# Patient Record
Sex: FEMALE | Race: WHITE | NOT HISPANIC OR LATINO | Employment: UNEMPLOYED | URBAN - METROPOLITAN AREA
[De-identification: names, ages, dates, MRNs, and addresses within clinical notes are randomized per-mention and may not be internally consistent; named-entity substitution may affect disease eponyms.]

---

## 2023-04-07 LAB
ALANINE AMINOTRANSFERASE (SGPT) (U/L) IN SER/PLAS: 43 U/L (ref 7–45)
ALBUMIN (G/DL) IN SER/PLAS: 3.9 G/DL (ref 3.4–5)
ALKALINE PHOSPHATASE (U/L) IN SER/PLAS: 118 U/L (ref 33–136)
ANION GAP IN SER/PLAS: 12 MMOL/L (ref 10–20)
ASPARTATE AMINOTRANSFERASE (SGOT) (U/L) IN SER/PLAS: 22 U/L (ref 9–39)
BILIRUBIN TOTAL (MG/DL) IN SER/PLAS: 0.3 MG/DL (ref 0–1.2)
CALCIUM (MG/DL) IN SER/PLAS: 10.1 MG/DL (ref 8.6–10.6)
CARBON DIOXIDE, TOTAL (MMOL/L) IN SER/PLAS: 32 MMOL/L (ref 21–32)
CHLORIDE (MMOL/L) IN SER/PLAS: 102 MMOL/L (ref 98–107)
CHOLESTEROL (MG/DL) IN SER/PLAS: 135 MG/DL (ref 0–199)
CHOLESTEROL IN HDL (MG/DL) IN SER/PLAS: 47.6 MG/DL
CHOLESTEROL/HDL RATIO: 2.8
CREATININE (MG/DL) IN SER/PLAS: 0.77 MG/DL (ref 0.5–1.05)
GFR FEMALE: 86 ML/MIN/1.73M2
GLUCOSE (MG/DL) IN SER/PLAS: 96 MG/DL (ref 74–99)
LDL: 60 MG/DL (ref 0–99)
POTASSIUM (MMOL/L) IN SER/PLAS: 4.2 MMOL/L (ref 3.5–5.3)
PROTEIN TOTAL: 6.6 G/DL (ref 6.4–8.2)
SODIUM (MMOL/L) IN SER/PLAS: 142 MMOL/L (ref 136–145)
THYROTROPIN (MIU/L) IN SER/PLAS BY DETECTION LIMIT <= 0.05 MIU/L: 0.71 MIU/L (ref 0.44–3.98)
THYROXINE (T4) FREE (NG/DL) IN SER/PLAS: 1.05 NG/DL (ref 0.78–1.48)
TRIGLYCERIDE (MG/DL) IN SER/PLAS: 138 MG/DL (ref 0–149)
TRIIODOTHYRONINE (T3) FREE (PG/ML) IN SER/PLAS: 4.4 PG/ML (ref 2.3–4.2)
UREA NITROGEN (MG/DL) IN SER/PLAS: 14 MG/DL (ref 6–23)
VLDL: 28 MG/DL (ref 0–40)

## 2023-04-09 DIAGNOSIS — I10 HYPERTENSION, BENIGN: Primary | ICD-10-CM

## 2023-04-09 PROBLEM — M54.17 LUMBOSACRAL RADICULITIS: Status: ACTIVE | Noted: 2023-04-09

## 2023-04-09 PROBLEM — I82.401 ACUTE THROMBOEMBOLISM OF DEEP VEINS OF RIGHT LOWER EXTREMITY (MULTI): Status: ACTIVE | Noted: 2023-04-09

## 2023-04-09 PROBLEM — D64.9 ANEMIA: Status: ACTIVE | Noted: 2023-04-09

## 2023-04-09 PROBLEM — K76.0 FATTY LIVER: Status: ACTIVE | Noted: 2023-04-09

## 2023-04-09 PROBLEM — E06.3 HASHIMOTO'S THYROIDITIS: Status: ACTIVE | Noted: 2023-04-09

## 2023-04-09 PROBLEM — E78.2 HYPERLIPIDEMIA, MIXED: Status: ACTIVE | Noted: 2023-04-09

## 2023-04-09 PROBLEM — K63.5 COLON POLYPS: Status: ACTIVE | Noted: 2023-04-09

## 2023-04-09 PROBLEM — E55.9 VITAMIN D DEFICIENCY: Status: ACTIVE | Noted: 2023-04-09

## 2023-04-09 PROBLEM — N95.1 HOT FLASHES, MENOPAUSAL: Status: ACTIVE | Noted: 2023-04-09

## 2023-04-09 PROBLEM — F32.A DEPRESSION: Status: ACTIVE | Noted: 2023-04-09

## 2023-04-09 PROBLEM — S46.019A STRAIN OF ROTATOR CUFF CAPSULE: Status: ACTIVE | Noted: 2023-04-09

## 2023-04-09 PROBLEM — M43.16 SPONDYLOLISTHESIS AT L4-L5 LEVEL: Status: ACTIVE | Noted: 2023-04-09

## 2023-04-09 PROBLEM — R73.01 IMPAIRED FASTING GLUCOSE: Status: ACTIVE | Noted: 2023-04-09

## 2023-04-09 RX ORDER — LISINOPRIL 20 MG/1
20 TABLET ORAL DAILY
Qty: 90 TABLET | Refills: 0 | Status: SHIPPED | OUTPATIENT
Start: 2023-04-09 | End: 2023-07-07 | Stop reason: SDUPTHER

## 2023-04-09 RX ORDER — ACETAMINOPHEN 500 MG
1 TABLET ORAL DAILY
COMMUNITY
Start: 2020-03-25 | End: 2023-10-17 | Stop reason: ALTCHOICE

## 2023-04-09 RX ORDER — LISINOPRIL 20 MG/1
1 TABLET ORAL DAILY
COMMUNITY
Start: 2014-12-15 | End: 2023-04-09 | Stop reason: SDUPTHER

## 2023-04-09 RX ORDER — BUPROPION HYDROCHLORIDE 300 MG/1
1 TABLET ORAL DAILY
COMMUNITY
Start: 2016-01-18 | End: 2023-04-20

## 2023-04-09 RX ORDER — SIMVASTATIN 40 MG/1
1 TABLET, FILM COATED ORAL DAILY
COMMUNITY
Start: 2016-11-07 | End: 2023-07-07

## 2023-04-09 RX ORDER — THYROID 60 MG/1
1 TABLET ORAL 2 TIMES DAILY
COMMUNITY
Start: 2014-10-20 | End: 2023-04-12

## 2023-04-09 RX ORDER — GABAPENTIN 300 MG/1
CAPSULE ORAL
COMMUNITY
Start: 2019-12-09 | End: 2023-10-30

## 2023-04-09 RX ORDER — HYDROCHLOROTHIAZIDE 25 MG/1
25 TABLET ORAL DAILY
Qty: 90 TABLET | Refills: 0 | Status: SHIPPED | OUTPATIENT
Start: 2023-04-09 | End: 2023-07-07

## 2023-04-09 RX ORDER — HYDROCHLOROTHIAZIDE 25 MG/1
1 TABLET ORAL DAILY
COMMUNITY
Start: 2014-12-15 | End: 2023-04-09 | Stop reason: SDUPTHER

## 2023-04-10 DIAGNOSIS — E06.3 HASHIMOTO'S THYROIDITIS: Primary | ICD-10-CM

## 2023-04-12 RX ORDER — LEVOTHYROXINE, LIOTHYRONINE 38; 9 UG/1; UG/1
TABLET ORAL
Qty: 180 TABLET | Refills: 1 | Status: SHIPPED | OUTPATIENT
Start: 2023-04-12 | End: 2023-10-18 | Stop reason: SDUPTHER

## 2023-04-14 DIAGNOSIS — F33.42 RECURRENT MAJOR DEPRESSIVE DISORDER, IN FULL REMISSION (CMS-HCC): Primary | ICD-10-CM

## 2023-04-20 ENCOUNTER — TELEPHONE (OUTPATIENT)
Dept: PRIMARY CARE | Facility: CLINIC | Age: 63
End: 2023-04-20

## 2023-04-20 RX ORDER — BUPROPION HYDROCHLORIDE 300 MG/1
300 TABLET ORAL DAILY
Qty: 90 TABLET | Refills: 3 | Status: SHIPPED | OUTPATIENT
Start: 2023-04-20 | End: 2024-05-15 | Stop reason: SDUPTHER

## 2023-04-20 NOTE — TELEPHONE ENCOUNTER
Rx Refill Request Telephone Encounter    Name:  Kristyn Quinones  :  [id number]  Medication Name:  Bupropion HCI  mg  Dose : 1 tablet  Route : oral  Frequency : daily  Quantity :    Directions :    Specific Pharmacy location:  [facility]  Date of last appointment:    Date of next appointment:    Best number to reach patient:

## 2023-05-01 ENCOUNTER — TELEPHONE (OUTPATIENT)
Dept: PRIMARY CARE | Facility: CLINIC | Age: 63
End: 2023-05-01

## 2023-05-01 RX ORDER — APIXABAN 5 MG/1
TABLET, FILM COATED ORAL
Qty: 180 TABLET | Refills: 0 | OUTPATIENT
Start: 2023-05-01

## 2023-05-31 ENCOUNTER — TELEPHONE (OUTPATIENT)
Dept: PRIMARY CARE | Facility: CLINIC | Age: 63
End: 2023-05-31

## 2023-06-01 ENCOUNTER — OFFICE VISIT (OUTPATIENT)
Dept: PRIMARY CARE | Facility: CLINIC | Age: 63
End: 2023-06-01
Payer: COMMERCIAL

## 2023-06-01 VITALS
WEIGHT: 210 LBS | HEIGHT: 64 IN | DIASTOLIC BLOOD PRESSURE: 75 MMHG | OXYGEN SATURATION: 97 % | BODY MASS INDEX: 35.85 KG/M2 | SYSTOLIC BLOOD PRESSURE: 113 MMHG | TEMPERATURE: 97.2 F | HEART RATE: 62 BPM

## 2023-06-01 DIAGNOSIS — I10 HYPERTENSION, BENIGN: ICD-10-CM

## 2023-06-01 DIAGNOSIS — E66.01 CLASS 2 SEVERE OBESITY WITH SERIOUS COMORBIDITY AND BODY MASS INDEX (BMI) OF 36.0 TO 36.9 IN ADULT, UNSPECIFIED OBESITY TYPE (MULTI): ICD-10-CM

## 2023-06-01 DIAGNOSIS — I82.401 ACUTE THROMBOEMBOLISM OF DEEP VEINS OF RIGHT LOWER EXTREMITY (MULTI): ICD-10-CM

## 2023-06-01 DIAGNOSIS — S73.191A TEAR OF RIGHT ACETABULAR LABRUM, INITIAL ENCOUNTER: Primary | ICD-10-CM

## 2023-06-01 DIAGNOSIS — Z01.810 PREOPERATIVE CARDIOVASCULAR EXAMINATION: ICD-10-CM

## 2023-06-01 PROBLEM — E66.812 CLASS 2 SEVERE OBESITY WITH SERIOUS COMORBIDITY AND BODY MASS INDEX (BMI) OF 36.0 TO 36.9 IN ADULT: Status: ACTIVE | Noted: 2022-09-11

## 2023-06-01 PROBLEM — E66.9 OBESITY, CLASS I, BMI 30-34.9: Status: ACTIVE | Noted: 2022-09-11

## 2023-06-01 PROBLEM — E66.811 OBESITY, CLASS I, BMI 30-34.9: Status: ACTIVE | Noted: 2022-09-11

## 2023-06-01 PROCEDURE — 93000 ELECTROCARDIOGRAM COMPLETE: CPT | Performed by: FAMILY MEDICINE

## 2023-06-01 PROCEDURE — 3074F SYST BP LT 130 MM HG: CPT | Performed by: FAMILY MEDICINE

## 2023-06-01 PROCEDURE — 3078F DIAST BP <80 MM HG: CPT | Performed by: FAMILY MEDICINE

## 2023-06-01 PROCEDURE — 3008F BODY MASS INDEX DOCD: CPT | Performed by: FAMILY MEDICINE

## 2023-06-01 PROCEDURE — 99214 OFFICE O/P EST MOD 30 MIN: CPT | Performed by: FAMILY MEDICINE

## 2023-06-01 ASSESSMENT — ENCOUNTER SYMPTOMS
NERVOUS/ANXIOUS: 0
VOMITING: 0
DYSURIA: 0
RHINORRHEA: 0
WEAKNESS: 1
FREQUENCY: 0
COUGH: 0
SHORTNESS OF BREATH: 0
EYE PAIN: 0
BACK PAIN: 1
SLEEP DISTURBANCE: 1
PALPITATIONS: 0
DIZZINESS: 0
FATIGUE: 0
ABDOMINAL PAIN: 0
DIARRHEA: 0
ARTHRALGIAS: 1
SORE THROAT: 0
NAUSEA: 0
HEADACHES: 0
DYSPHORIC MOOD: 0
BLOOD IN STOOL: 0
CONSTIPATION: 1
NUMBNESS: 0
UNEXPECTED WEIGHT CHANGE: 1

## 2023-06-01 ASSESSMENT — LIFESTYLE VARIABLES: HOW OFTEN DO YOU HAVE A DRINK CONTAINING ALCOHOL: MONTHLY OR LESS

## 2023-06-01 ASSESSMENT — PATIENT HEALTH QUESTIONNAIRE - PHQ9
SUM OF ALL RESPONSES TO PHQ9 QUESTIONS 1 AND 2: 0
1. LITTLE INTEREST OR PLEASURE IN DOING THINGS: NOT AT ALL
2. FEELING DOWN, DEPRESSED OR HOPELESS: NOT AT ALL

## 2023-06-01 NOTE — PROGRESS NOTES
"Subjective   Patient ID: Kristyn Quinones is a 63 y.o. female who presents for Pre-op Exam (For right hip surgery).    [facility] is here for preoperative cardiovascular evaluation.  She has torn ligaments and cartilage in her right hip and will be having arthroscopic surgery to repair it.  She has no history of problems with surgery or anesthesia.  She has no bleeding problems.  She does have a history of DVT which was felt to be due to inactivity. No known heart problems.  She is currently inactive due to pain.        Review of Systems   Constitutional:  Positive for unexpected weight change (gaining weight due to inactivity). Negative for fatigue.   HENT:  Negative for congestion, ear pain, rhinorrhea and sore throat.    Eyes:  Negative for pain and visual disturbance.   Respiratory:  Negative for cough and shortness of breath.    Cardiovascular:  Negative for chest pain and palpitations.   Gastrointestinal:  Positive for constipation (due to narcotics). Negative for abdominal pain, blood in stool, diarrhea, nausea and vomiting.   Genitourinary:  Negative for dysuria, frequency and vaginal discharge.   Musculoskeletal:  Positive for arthralgias and back pain.   Skin:  Negative for rash.   Neurological:  Positive for weakness (right leg). Negative for dizziness, numbness and headaches.   Psychiatric/Behavioral:  Positive for sleep disturbance. Negative for dysphoric mood. The patient is not nervous/anxious.        Objective     /75   Pulse 62   Temp 36.2 °C (97.2 °F)   Ht 1.626 m (5' 4\")   Wt 95.3 kg (210 lb)   SpO2 97%   BMI 36.05 kg/m²     Physical Exam  Constitutional:       General: She is not in acute distress.     Appearance: She is obese.   HENT:      Right Ear: Tympanic membrane normal.      Left Ear: Tympanic membrane normal.   Neck:      Thyroid: No thyromegaly.   Cardiovascular:      Rate and Rhythm: Regular rhythm. Tachycardia present.      Heart sounds: No murmur heard.  Pulmonary:      " Effort: No respiratory distress.      Breath sounds: Normal breath sounds.   Abdominal:      General: Bowel sounds are normal. There is no distension.      Palpations: Abdomen is soft. There is no hepatomegaly or splenomegaly.      Tenderness: There is no abdominal tenderness.   Musculoskeletal:      Comments: Gait is antalgic, walking with a limp.  Right thigh is mildly swollen.  Full exam deferred to orthopedics.   Lymphadenopathy:      Cervical: No cervical adenopathy.   Skin:     General: Skin is warm and dry.      Coloration: Skin is not jaundiced.      Findings: No rash.   Neurological:      General: No focal deficit present.      Mental Status: She is alert and oriented to person, place, and time.   Psychiatric:         Mood and Affect: Mood normal.         Behavior: Behavior normal.             Assessment/Plan   Problem List Items Addressed This Visit          Circulatory    Acute thromboembolism of deep veins of right lower extremity (CMS/HCC)    Current Assessment & Plan     She will need assiduous attention to anticoagulation post-op.         Hypertension, benign    Current Assessment & Plan     Blood pressure is controlled.            Musculoskeletal    Labral tear of right hip joint - Primary       Endocrine/Metabolic    Class 2 severe obesity with serious comorbidity and body mass index (BMI) of 36.0 to 36.9 in adult (CMS/HCC)     Other Visit Diagnoses       Preoperative cardiovascular examination        She is at acceptable risk for elective orthopedic surgery.  Recommend proceeding.    Relevant Orders    ECG 12 lead (Completed)

## 2023-07-05 DIAGNOSIS — E78.2 HYPERLIPIDEMIA, MIXED: Primary | ICD-10-CM

## 2023-07-05 DIAGNOSIS — I10 HYPERTENSION, BENIGN: ICD-10-CM

## 2023-07-06 ENCOUNTER — TELEPHONE (OUTPATIENT)
Dept: PRIMARY CARE | Facility: CLINIC | Age: 63
End: 2023-07-06

## 2023-07-06 NOTE — TELEPHONE ENCOUNTER
Pt also has two other refills called in from pharmacy.    Name:  Kristyn F Joni  :  [id number]  Medication Name:  Lisinopril  Dose : 20 Mg  Route : Oral  Frequency : 1 per day  Quantity : 90 tablets  Directions : Take one tablet by mouth once daily  Specific Pharmacy location:  [facility]  Date of last appointment:  2023  Date of next appointment:  N/A

## 2023-07-07 DIAGNOSIS — I10 HYPERTENSION, BENIGN: ICD-10-CM

## 2023-07-07 RX ORDER — HYDROCHLOROTHIAZIDE 25 MG/1
TABLET ORAL
Qty: 90 TABLET | Refills: 0 | Status: SHIPPED | OUTPATIENT
Start: 2023-07-07 | End: 2023-10-18 | Stop reason: SDUPTHER

## 2023-07-07 RX ORDER — SIMVASTATIN 40 MG/1
TABLET, FILM COATED ORAL
Qty: 90 TABLET | Refills: 0 | Status: SHIPPED | OUTPATIENT
Start: 2023-07-07 | End: 2023-10-18 | Stop reason: SDUPTHER

## 2023-07-07 RX ORDER — LISINOPRIL 20 MG/1
20 TABLET ORAL DAILY
Qty: 90 TABLET | Refills: 0 | Status: SHIPPED | OUTPATIENT
Start: 2023-07-07 | End: 2023-10-17 | Stop reason: SDUPTHER

## 2023-10-10 DIAGNOSIS — E78.2 HYPERLIPIDEMIA, MIXED: ICD-10-CM

## 2023-10-10 RX ORDER — SIMVASTATIN 40 MG/1
TABLET, FILM COATED ORAL
Qty: 90 TABLET | Refills: 0 | OUTPATIENT
Start: 2023-10-10

## 2023-10-10 RX ORDER — GABAPENTIN 300 MG/1
CAPSULE ORAL
Qty: 270 CAPSULE | Refills: 0 | OUTPATIENT
Start: 2023-10-10

## 2023-10-11 ENCOUNTER — TELEPHONE (OUTPATIENT)
Dept: PRIMARY CARE | Facility: CLINIC | Age: 63
End: 2023-10-11

## 2023-10-11 NOTE — TELEPHONE ENCOUNTER
Patient needs medications renewed to [facility].    Gabapentin 300 MG  Hydrochlorothiazide 25 MG  Lisinopril 20 MG  Simvastatin 40 MG

## 2023-10-15 RX ORDER — NAPROXEN 500 MG/1
500 TABLET ORAL DAILY
COMMUNITY
Start: 2023-06-14 | End: 2023-10-17 | Stop reason: ALTCHOICE

## 2023-10-15 RX ORDER — APIXABAN 5 MG/1
10 TABLET, FILM COATED ORAL 2 TIMES DAILY
COMMUNITY
Start: 2023-06-14 | End: 2023-10-17 | Stop reason: ALTCHOICE

## 2023-10-17 ENCOUNTER — LAB (OUTPATIENT)
Dept: LAB | Facility: LAB | Age: 63
End: 2023-10-17
Payer: COMMERCIAL

## 2023-10-17 ENCOUNTER — OFFICE VISIT (OUTPATIENT)
Dept: PRIMARY CARE | Facility: CLINIC | Age: 63
End: 2023-10-17
Payer: COMMERCIAL

## 2023-10-17 VITALS
WEIGHT: 207.2 LBS | BODY MASS INDEX: 35.57 KG/M2 | HEART RATE: 87 BPM | TEMPERATURE: 97.7 F | OXYGEN SATURATION: 98 % | DIASTOLIC BLOOD PRESSURE: 62 MMHG | SYSTOLIC BLOOD PRESSURE: 95 MMHG

## 2023-10-17 DIAGNOSIS — Z11.59 NEED FOR HEPATITIS C SCREENING TEST: ICD-10-CM

## 2023-10-17 DIAGNOSIS — I10 HYPERTENSION, BENIGN: Primary | ICD-10-CM

## 2023-10-17 DIAGNOSIS — E06.3 HASHIMOTO'S THYROIDITIS: ICD-10-CM

## 2023-10-17 DIAGNOSIS — I10 HYPERTENSION, BENIGN: ICD-10-CM

## 2023-10-17 PROBLEM — I82.401 ACUTE THROMBOEMBOLISM OF DEEP VEINS OF RIGHT LOWER EXTREMITY (MULTI): Status: RESOLVED | Noted: 2023-04-09 | Resolved: 2023-10-17

## 2023-10-17 LAB
ALBUMIN SERPL BCP-MCNC: 4.2 G/DL (ref 3.4–5)
ALP SERPL-CCNC: 134 U/L (ref 33–136)
ALT SERPL W P-5'-P-CCNC: 28 U/L (ref 7–45)
ANION GAP SERPL CALC-SCNC: 14 MMOL/L (ref 10–20)
AST SERPL W P-5'-P-CCNC: 24 U/L (ref 9–39)
BILIRUB SERPL-MCNC: 0.3 MG/DL (ref 0–1.2)
BUN SERPL-MCNC: 8 MG/DL (ref 6–23)
CALCIUM SERPL-MCNC: 10.7 MG/DL (ref 8.6–10.6)
CHLORIDE SERPL-SCNC: 103 MMOL/L (ref 98–107)
CHOLEST SERPL-MCNC: 147 MG/DL (ref 0–199)
CHOLESTEROL/HDL RATIO: 3.3
CO2 SERPL-SCNC: 30 MMOL/L (ref 21–32)
CREAT SERPL-MCNC: 0.81 MG/DL (ref 0.5–1.05)
GFR SERPL CREATININE-BSD FRML MDRD: 81 ML/MIN/1.73M*2
GLUCOSE SERPL-MCNC: 87 MG/DL (ref 74–99)
HCV AB SER QL: NONREACTIVE
HDLC SERPL-MCNC: 45 MG/DL
LDLC SERPL CALC-MCNC: 58 MG/DL
NON HDL CHOLESTEROL: 102 MG/DL (ref 0–149)
POTASSIUM SERPL-SCNC: 4 MMOL/L (ref 3.5–5.3)
PROT SERPL-MCNC: 6.8 G/DL (ref 6.4–8.2)
SODIUM SERPL-SCNC: 143 MMOL/L (ref 136–145)
T3FREE SERPL-MCNC: 4.7 PG/ML (ref 2.3–4.2)
T4 FREE SERPL-MCNC: 0.99 NG/DL (ref 0.78–1.48)
TRIGL SERPL-MCNC: 221 MG/DL (ref 0–149)
TSH SERPL-ACNC: 0.06 MIU/L (ref 0.44–3.98)
VLDL: 44 MG/DL (ref 0–40)

## 2023-10-17 PROCEDURE — 84439 ASSAY OF FREE THYROXINE: CPT

## 2023-10-17 PROCEDURE — 99214 OFFICE O/P EST MOD 30 MIN: CPT | Performed by: FAMILY MEDICINE

## 2023-10-17 PROCEDURE — 3074F SYST BP LT 130 MM HG: CPT | Performed by: FAMILY MEDICINE

## 2023-10-17 PROCEDURE — 36415 COLL VENOUS BLD VENIPUNCTURE: CPT

## 2023-10-17 PROCEDURE — 3008F BODY MASS INDEX DOCD: CPT | Performed by: FAMILY MEDICINE

## 2023-10-17 PROCEDURE — 86803 HEPATITIS C AB TEST: CPT

## 2023-10-17 PROCEDURE — 80061 LIPID PANEL: CPT

## 2023-10-17 PROCEDURE — 84443 ASSAY THYROID STIM HORMONE: CPT

## 2023-10-17 PROCEDURE — 3078F DIAST BP <80 MM HG: CPT | Performed by: FAMILY MEDICINE

## 2023-10-17 PROCEDURE — 80053 COMPREHEN METABOLIC PANEL: CPT

## 2023-10-17 PROCEDURE — 84481 FREE ASSAY (FT-3): CPT

## 2023-10-17 RX ORDER — LISINOPRIL 10 MG/1
10 TABLET ORAL DAILY
Qty: 90 TABLET | Refills: 0 | Status: SHIPPED | OUTPATIENT
Start: 2023-10-17 | End: 2024-02-26 | Stop reason: SDUPTHER

## 2023-10-17 ASSESSMENT — ENCOUNTER SYMPTOMS
SORE THROAT: 0
HEADACHES: 0
FATIGUE: 0
CONSTIPATION: 1
UNEXPECTED WEIGHT CHANGE: 1
WEAKNESS: 1
BLOOD IN STOOL: 0
ARTHRALGIAS: 1
SLEEP DISTURBANCE: 1
RHINORRHEA: 0
PALPITATIONS: 0
BACK PAIN: 1
DIARRHEA: 0
NAUSEA: 0
COUGH: 0
FREQUENCY: 0
NERVOUS/ANXIOUS: 0
DYSPHORIC MOOD: 0
ABDOMINAL PAIN: 0
DYSURIA: 0
EYE PAIN: 0
SHORTNESS OF BREATH: 0
DIZZINESS: 0
VOMITING: 0
NUMBNESS: 0

## 2023-10-17 ASSESSMENT — PATIENT HEALTH QUESTIONNAIRE - PHQ9
1. LITTLE INTEREST OR PLEASURE IN DOING THINGS: NOT AT ALL
2. FEELING DOWN, DEPRESSED OR HOPELESS: NOT AT ALL
SUM OF ALL RESPONSES TO PHQ9 QUESTIONS 1 AND 2: 0

## 2023-10-17 NOTE — PROGRESS NOTES
Subjective   Patient ID: Kristyn Quinones is a 63 y.o. female who presents for Follow-up (Refills, and blood work.).    [facility] is here to follow up on her blood pressure.  She has not been monitoring it at home.  She is starting to get more active, had hip surgery earlier this year.  She still has a lot of pain in her tailbone, has seen pain management but it has been a long time.        Review of Systems   Constitutional:  Positive for unexpected weight change (gaining weight due to inactivity). Negative for fatigue.   HENT:  Negative for congestion, ear pain, rhinorrhea and sore throat.    Eyes:  Negative for pain and visual disturbance.   Respiratory:  Negative for cough and shortness of breath.    Cardiovascular:  Negative for chest pain and palpitations.   Gastrointestinal:  Positive for constipation (due to narcotics). Negative for abdominal pain, blood in stool, diarrhea, nausea and vomiting.   Genitourinary:  Negative for dysuria, frequency and vaginal discharge.   Musculoskeletal:  Positive for arthralgias and back pain.   Skin:  Negative for rash.   Neurological:  Positive for weakness (right leg). Negative for dizziness, numbness and headaches.   Psychiatric/Behavioral:  Positive for sleep disturbance. Negative for dysphoric mood. The patient is not nervous/anxious.        Objective     BP 95/62   Pulse 87   Temp 36.5 °C (97.7 °F)   Wt 94 kg (207 lb 3.2 oz)   SpO2 98%   BMI 35.57 kg/m²     Physical Exam  Constitutional:       General: She is not in acute distress.     Appearance: She is obese.   HENT:      Right Ear: Tympanic membrane normal.      Left Ear: Tympanic membrane normal.   Neck:      Thyroid: No thyromegaly.   Cardiovascular:      Rate and Rhythm: Normal rate and regular rhythm.      Heart sounds: No murmur heard.  Pulmonary:      Effort: No respiratory distress.      Breath sounds: Normal breath sounds.   Abdominal:      General: Bowel sounds are normal. There is no distension.       Palpations: Abdomen is soft. There is no hepatomegaly or splenomegaly.   Musculoskeletal:      Comments: Gait is antalgic, walks with a cane.   Lymphadenopathy:      Cervical: No cervical adenopathy.   Neurological:      Mental Status: She is alert.             Assessment/Plan   Problem List Items Addressed This Visit       Hashimoto's thyroiditis    Relevant Orders    TSH    T4, free    T3, free    Hypertension, benign - Primary    Current Assessment & Plan     Blood pressure is a bit lower than I like, will decrease the lisinopril.  Labs ordered.         Relevant Medications    lisinopril 10 mg tablet    Other Relevant Orders    Lipid Panel    Comprehensive Metabolic Panel     Other Visit Diagnoses       Need for hepatitis C screening test        Relevant Orders    Hepatitis C Antibody

## 2023-10-18 DIAGNOSIS — E06.3 HASHIMOTO'S THYROIDITIS: ICD-10-CM

## 2023-10-18 DIAGNOSIS — I10 HYPERTENSION, BENIGN: ICD-10-CM

## 2023-10-18 DIAGNOSIS — E78.2 HYPERLIPIDEMIA, MIXED: ICD-10-CM

## 2023-10-18 RX ORDER — THYROID 60 MG/1
60 TABLET ORAL
Qty: 30 TABLET | Refills: 1 | Status: SHIPPED | OUTPATIENT
Start: 2023-10-18 | End: 2023-12-21 | Stop reason: SDUPTHER

## 2023-10-18 RX ORDER — SIMVASTATIN 40 MG/1
40 TABLET, FILM COATED ORAL DAILY
Qty: 90 TABLET | Refills: 1 | Status: SHIPPED | OUTPATIENT
Start: 2023-10-18 | End: 2024-05-20 | Stop reason: SDUPTHER

## 2023-10-18 RX ORDER — THYROID 30 MG/1
TABLET ORAL
Qty: 30 TABLET | Refills: 1 | Status: SHIPPED | OUTPATIENT
Start: 2023-10-18 | End: 2023-12-21 | Stop reason: SDUPTHER

## 2023-10-18 RX ORDER — HYDROCHLOROTHIAZIDE 25 MG/1
25 TABLET ORAL DAILY
Qty: 90 TABLET | Refills: 1 | Status: SHIPPED | OUTPATIENT
Start: 2023-10-18 | End: 2024-05-20 | Stop reason: SDUPTHER

## 2023-10-24 ENCOUNTER — APPOINTMENT (OUTPATIENT)
Dept: PRIMARY CARE | Facility: CLINIC | Age: 63
End: 2023-10-24

## 2023-10-24 DIAGNOSIS — M54.17 LUMBOSACRAL RADICULITIS: Primary | ICD-10-CM

## 2023-10-30 RX ORDER — GABAPENTIN 300 MG/1
CAPSULE ORAL
Qty: 270 CAPSULE | Refills: 1 | Status: SHIPPED | OUTPATIENT
Start: 2023-10-30

## 2023-12-12 ENCOUNTER — LAB (OUTPATIENT)
Dept: LAB | Facility: LAB | Age: 63
End: 2023-12-12

## 2023-12-12 DIAGNOSIS — E06.3 HASHIMOTO'S THYROIDITIS: ICD-10-CM

## 2023-12-12 PROCEDURE — 84443 ASSAY THYROID STIM HORMONE: CPT

## 2023-12-12 PROCEDURE — 84481 FREE ASSAY (FT-3): CPT

## 2023-12-12 PROCEDURE — 84439 ASSAY OF FREE THYROXINE: CPT

## 2023-12-13 LAB
T3FREE SERPL-MCNC: 3.7 PG/ML (ref 2.3–4.2)
T4 FREE SERPL-MCNC: 0.72 NG/DL (ref 0.78–1.48)
TSH SERPL-ACNC: 9.15 MIU/L (ref 0.44–3.98)

## 2023-12-18 DIAGNOSIS — E06.3 HASHIMOTO'S THYROIDITIS: ICD-10-CM

## 2023-12-21 RX ORDER — THYROID 60 MG/1
60 TABLET ORAL
Qty: 90 TABLET | Refills: 0 | Status: SHIPPED | OUTPATIENT
Start: 2023-12-21 | End: 2024-05-20 | Stop reason: SDUPTHER

## 2023-12-21 RX ORDER — THYROID 30 MG/1
45 TABLET ORAL DAILY
Qty: 30 TABLET | Refills: 1 | Status: SHIPPED | OUTPATIENT
Start: 2023-12-21 | End: 2024-05-20 | Stop reason: DRUGHIGH

## 2024-02-07 RX ORDER — LISINOPRIL 20 MG/1
20 TABLET ORAL DAILY
Qty: 30 TABLET | Refills: 0 | OUTPATIENT
Start: 2024-02-07

## 2024-02-08 ENCOUNTER — TELEPHONE (OUTPATIENT)
Dept: PRIMARY CARE | Facility: CLINIC | Age: 64
End: 2024-02-08

## 2024-02-25 DIAGNOSIS — I10 HYPERTENSION, BENIGN: ICD-10-CM

## 2024-02-26 RX ORDER — LISINOPRIL 10 MG/1
10 TABLET ORAL DAILY
Qty: 90 TABLET | Refills: 0 | Status: SHIPPED | OUTPATIENT
Start: 2024-02-26 | End: 2024-05-26

## 2024-02-29 ENCOUNTER — TELEPHONE (OUTPATIENT)
Dept: PRIMARY CARE | Facility: CLINIC | Age: 64
End: 2024-02-29

## 2024-02-29 NOTE — TELEPHONE ENCOUNTER
Kirsten was calling from [facility] for this patient saying doctor melanie was requesting to put more diagnoses codes on her paperwork. I asked Doctor Mendoza and she said that what was sent in originally was what she wanted, and there is no need to send it in again.    I scanned the blank form to verify we did receive it.

## 2024-05-10 DIAGNOSIS — F33.42 RECURRENT MAJOR DEPRESSIVE DISORDER, IN FULL REMISSION (CMS-HCC): ICD-10-CM

## 2024-05-10 DIAGNOSIS — I10 HYPERTENSION, BENIGN: ICD-10-CM

## 2024-05-10 DIAGNOSIS — E06.3 HASHIMOTO'S THYROIDITIS: ICD-10-CM

## 2024-05-10 RX ORDER — BUPROPION HYDROCHLORIDE 300 MG/1
300 TABLET ORAL DAILY
Qty: 90 TABLET | Refills: 0 | OUTPATIENT
Start: 2024-05-10

## 2024-05-10 RX ORDER — HYDROCHLOROTHIAZIDE 25 MG/1
25 TABLET ORAL DAILY
Qty: 90 TABLET | Refills: 0 | OUTPATIENT
Start: 2024-05-10

## 2024-05-10 RX ORDER — LEVOTHYROXINE, LIOTHYRONINE 19; 4.5 UG/1; UG/1
TABLET ORAL
Qty: 30 TABLET | Refills: 0 | OUTPATIENT
Start: 2024-05-10

## 2024-05-14 ENCOUNTER — APPOINTMENT (OUTPATIENT)
Dept: PRIMARY CARE | Facility: CLINIC | Age: 64
End: 2024-05-14
Payer: MEDICARE

## 2024-05-15 ENCOUNTER — OFFICE VISIT (OUTPATIENT)
Dept: PRIMARY CARE | Facility: CLINIC | Age: 64
End: 2024-05-15
Payer: MEDICARE

## 2024-05-15 ENCOUNTER — LAB (OUTPATIENT)
Dept: LAB | Facility: LAB | Age: 64
End: 2024-05-15
Payer: MEDICARE

## 2024-05-15 VITALS
BODY MASS INDEX: 36.54 KG/M2 | HEART RATE: 91 BPM | HEIGHT: 64 IN | DIASTOLIC BLOOD PRESSURE: 82 MMHG | WEIGHT: 214 LBS | OXYGEN SATURATION: 95 % | SYSTOLIC BLOOD PRESSURE: 136 MMHG

## 2024-05-15 DIAGNOSIS — I10 HYPERTENSION, BENIGN: ICD-10-CM

## 2024-05-15 DIAGNOSIS — E78.2 HYPERLIPIDEMIA, MIXED: ICD-10-CM

## 2024-05-15 DIAGNOSIS — E55.9 VITAMIN D DEFICIENCY: ICD-10-CM

## 2024-05-15 DIAGNOSIS — Z13.6 SCREENING FOR CARDIOVASCULAR CONDITION: ICD-10-CM

## 2024-05-15 DIAGNOSIS — E66.01 CLASS 2 SEVERE OBESITY WITH SERIOUS COMORBIDITY AND BODY MASS INDEX (BMI) OF 36.0 TO 36.9 IN ADULT, UNSPECIFIED OBESITY TYPE (MULTI): ICD-10-CM

## 2024-05-15 DIAGNOSIS — Z23 NEED FOR VACCINATION: ICD-10-CM

## 2024-05-15 DIAGNOSIS — Z00.00 ROUTINE GENERAL MEDICAL EXAMINATION AT HEALTH CARE FACILITY: Primary | ICD-10-CM

## 2024-05-15 DIAGNOSIS — Z78.0 ASYMPTOMATIC MENOPAUSAL STATE: ICD-10-CM

## 2024-05-15 DIAGNOSIS — F33.42 RECURRENT MAJOR DEPRESSIVE DISORDER, IN FULL REMISSION (CMS-HCC): ICD-10-CM

## 2024-05-15 DIAGNOSIS — E06.3 HASHIMOTO'S THYROIDITIS: ICD-10-CM

## 2024-05-15 DIAGNOSIS — Z12.31 ENCOUNTER FOR SCREENING MAMMOGRAM FOR BREAST CANCER: ICD-10-CM

## 2024-05-15 DIAGNOSIS — R73.01 IMPAIRED FASTING GLUCOSE: ICD-10-CM

## 2024-05-15 LAB
25(OH)D3 SERPL-MCNC: 29 NG/ML (ref 30–100)
ALBUMIN SERPL BCP-MCNC: 4.3 G/DL (ref 3.4–5)
ALP SERPL-CCNC: 111 U/L (ref 33–136)
ALT SERPL W P-5'-P-CCNC: 33 U/L (ref 7–45)
ANION GAP SERPL CALC-SCNC: 15 MMOL/L (ref 10–20)
AST SERPL W P-5'-P-CCNC: 28 U/L (ref 9–39)
BILIRUB SERPL-MCNC: 0.5 MG/DL (ref 0–1.2)
BUN SERPL-MCNC: 12 MG/DL (ref 6–23)
CALCIUM SERPL-MCNC: 10.2 MG/DL (ref 8.6–10.6)
CHLORIDE SERPL-SCNC: 102 MMOL/L (ref 98–107)
CHOLEST SERPL-MCNC: 159 MG/DL (ref 0–199)
CHOLESTEROL/HDL RATIO: 3.1
CO2 SERPL-SCNC: 30 MMOL/L (ref 21–32)
CREAT SERPL-MCNC: 0.84 MG/DL (ref 0.5–1.05)
CREAT UR-MCNC: 86.9 MG/DL (ref 20–320)
CRP SERPL HS-MCNC: 3.3 MG/L
EGFRCR SERPLBLD CKD-EPI 2021: 78 ML/MIN/1.73M*2
GLUCOSE SERPL-MCNC: 86 MG/DL (ref 74–99)
HCYS SERPL-SCNC: 15.14 UMOL/L (ref 5–13.9)
HDLC SERPL-MCNC: 50.7 MG/DL
LDLC SERPL CALC-MCNC: 71 MG/DL
MICROALBUMIN UR-MCNC: <7 MG/L
MICROALBUMIN/CREAT UR: NORMAL MG/G{CREAT}
NON HDL CHOLESTEROL: 108 MG/DL (ref 0–149)
POC HEMOGLOBIN A1C: 6.1 % (ref 4.2–6.5)
POTASSIUM SERPL-SCNC: 4.2 MMOL/L (ref 3.5–5.3)
PROT SERPL-MCNC: 7.1 G/DL (ref 6.4–8.2)
SODIUM SERPL-SCNC: 143 MMOL/L (ref 136–145)
T4 FREE SERPL-MCNC: 0.77 NG/DL (ref 0.78–1.48)
TRIGL SERPL-MCNC: 189 MG/DL (ref 0–149)
TSH SERPL-ACNC: 19.58 MIU/L (ref 0.44–3.98)
URATE SERPL-MCNC: 7.2 MG/DL (ref 2.3–6.7)
VLDL: 38 MG/DL (ref 0–40)

## 2024-05-15 PROCEDURE — 83036 HEMOGLOBIN GLYCOSYLATED A1C: CPT | Performed by: FAMILY MEDICINE

## 2024-05-15 PROCEDURE — 83090 ASSAY OF HOMOCYSTEINE: CPT

## 2024-05-15 PROCEDURE — 3079F DIAST BP 80-89 MM HG: CPT | Performed by: FAMILY MEDICINE

## 2024-05-15 PROCEDURE — 82306 VITAMIN D 25 HYDROXY: CPT

## 2024-05-15 PROCEDURE — 80053 COMPREHEN METABOLIC PANEL: CPT

## 2024-05-15 PROCEDURE — 36415 COLL VENOUS BLD VENIPUNCTURE: CPT

## 2024-05-15 PROCEDURE — 86141 C-REACTIVE PROTEIN HS: CPT

## 2024-05-15 PROCEDURE — G0402 INITIAL PREVENTIVE EXAM: HCPCS | Performed by: FAMILY MEDICINE

## 2024-05-15 PROCEDURE — 83695 ASSAY OF LIPOPROTEIN(A): CPT

## 2024-05-15 PROCEDURE — 84439 ASSAY OF FREE THYROXINE: CPT

## 2024-05-15 PROCEDURE — 80061 LIPID PANEL: CPT

## 2024-05-15 PROCEDURE — 82043 UR ALBUMIN QUANTITATIVE: CPT

## 2024-05-15 PROCEDURE — 99214 OFFICE O/P EST MOD 30 MIN: CPT | Performed by: FAMILY MEDICINE

## 2024-05-15 PROCEDURE — G0403 EKG FOR INITIAL PREVENT EXAM: HCPCS | Performed by: FAMILY MEDICINE

## 2024-05-15 PROCEDURE — 82570 ASSAY OF URINE CREATININE: CPT

## 2024-05-15 PROCEDURE — 84550 ASSAY OF BLOOD/URIC ACID: CPT

## 2024-05-15 PROCEDURE — 3075F SYST BP GE 130 - 139MM HG: CPT | Performed by: FAMILY MEDICINE

## 2024-05-15 PROCEDURE — 3008F BODY MASS INDEX DOCD: CPT | Performed by: FAMILY MEDICINE

## 2024-05-15 PROCEDURE — 84443 ASSAY THYROID STIM HORMONE: CPT

## 2024-05-15 RX ORDER — BUPROPION HYDROCHLORIDE 300 MG/1
300 TABLET ORAL DAILY
Qty: 90 TABLET | Refills: 3 | Status: SHIPPED | OUTPATIENT
Start: 2024-05-15 | End: 2025-05-15

## 2024-05-15 ASSESSMENT — ENCOUNTER SYMPTOMS
HEADACHES: 0
FATIGUE: 0
COUGH: 0
UNEXPECTED WEIGHT CHANGE: 1
VOMITING: 0
ABDOMINAL PAIN: 0
NUMBNESS: 0
CONSTIPATION: 1
NERVOUS/ANXIOUS: 0
BLOOD IN STOOL: 0
SORE THROAT: 0
BACK PAIN: 1
DIARRHEA: 0
NAUSEA: 0
RHINORRHEA: 0
DIZZINESS: 0
SLEEP DISTURBANCE: 0
WEAKNESS: 1
MYALGIAS: 0
SHORTNESS OF BREATH: 1
EYE PAIN: 0
DYSURIA: 0
ARTHRALGIAS: 1
FREQUENCY: 0
PALPITATIONS: 0
DYSPHORIC MOOD: 0

## 2024-05-15 ASSESSMENT — PATIENT HEALTH QUESTIONNAIRE - PHQ9
1. LITTLE INTEREST OR PLEASURE IN DOING THINGS: NOT AT ALL
2. FEELING DOWN, DEPRESSED OR HOPELESS: NOT AT ALL
SUM OF ALL RESPONSES TO PHQ9 QUESTIONS 1 AND 2: 0
1. LITTLE INTEREST OR PLEASURE IN DOING THINGS: NOT AT ALL
2. FEELING DOWN, DEPRESSED OR HOPELESS: NOT AT ALL
SUM OF ALL RESPONSES TO PHQ9 QUESTIONS 1 AND 2: 0

## 2024-05-15 ASSESSMENT — ACTIVITIES OF DAILY LIVING (ADL)
BATHING: INDEPENDENT
TAKING_MEDICATION: INDEPENDENT
MANAGING_FINANCES: INDEPENDENT
GROCERY_SHOPPING: INDEPENDENT
DRESSING: INDEPENDENT
DOING_HOUSEWORK: INDEPENDENT

## 2024-05-15 NOTE — ASSESSMENT & PLAN NOTE
She is prediabetic, will check labs and consider adding metformin.  Handout sent to her [employer] portal.

## 2024-05-15 NOTE — PATIENT INSTRUCTIONS
"Thank you for coming in to see me today, and congratulations on paying attention to staying healthy!    The single most important factor in staying healthy is eating a healthy diet.  Research has shown that the healthiest diet for humans is one rich in whole fresh plant foods.  This means most of what you eat should be fresh fruits and vegetables, whole grains, beans, nuts and seeds.  Adding meat, dairy foods and eggs does not make your food healthier (although it may make it taste better!) so you should work to minimize the amount of beef, chicken, pork, turkey, lamb, cheese and eggs you eat.  Fatty fish like salmon and tuna may be healthier alternatives.  If you would like more information please check out the website \"[place],\" and the books \"The Blue Zones\" by Tariq Orourke and \"Engine 2 Diet\" by Nam Khan.    I don't like recommending \"exercise\" because that has unpleasant connotations of pain and being out of breath for many people.  Instead, I encourage my patients to find a way to move your body that you LOVE and would want to do even if it were bad for you!  Playing a fun sport like pickleball, doing yoga or christina chi, studying martial arts, learning to dance, even chasing your kids or grandkids around the backyard are great examples of activity that makes your heart healthier.  Remember, if you don't like it, don't do it!  There are plenty of fun options, pick one and try it out!  Aim for at least 30 minutes of activity that gets your heart revved up, most days per week.    We discussed some screening tests for you today, these tests are meant to find problems before they make you sick, when they are easier to treat and less likely to impact your health.  Depending on your age and stage of life they may include blood tests, a Pap test, a mammogram, a bone density test, a colonoscopy and others.  I can also order a test called Galleri, which is a blood test to screen for cancer.  It is not yet " covered by insurance and costs about $800, but I'm happy to order it if you would like.  If you have questions later about these or other recommended tests please call and ask!    There are a number of other things you can do to improve your health.  These may include things like   Increasing the amount of water you drink every day (aim for 1 oz of water for every 2 pounds of body weight, up to about 100 oz total)  Getting 7-8 hours of sleep every night  Avoiding smoking, drinking alcohol, and using recreational drugs    Stress management is also a very important component of staying healthy.  One of the most effective stress management tools is meditation.  I recommend everyone consider proper training in meditation - it isn't just sitting with your eyes closed and breathing.  You can find a training program in your area at [employer] or [email or web address].    An annual physical is a great measure to keep you as healthy as you possibly can be.  Good for you for getting that done!  See you next year :-)    Current weight: 97.1 kg (214 lb)  Weight change since last visit (-) denotes wt loss 6.8 lbs   Weight loss needed to achieve BMI 25: 68.7 Lbs  Weight loss needed to achieve BMI 30: 39.6 Lbs

## 2024-05-15 NOTE — PROGRESS NOTES
Subjective   Patient ID: Kristyn Quinones is a 63 y.o. female who presents for Med Refill and Welcome To Medicare.    [facility] is here for her Welcome to Medicare visit.  She also needs follow up on her blood pressure, thyroid and cholesterol.  She has not been monitoring the blood pressure at home.  She is due for labs and refills.    Diet:  Healthy  Exercise:  Working on it  Sleep:  Getting better  Stress:  Low  Smoking:  Former, quit   EtOH:  Rare    Profession:  Retired     Dentist:  No (edentulous)  Eye doctor:  Due for recheck    Last Pap:  N/A (hysterectomy)  History of abnormal Pap:  Yes, remote, recent ones were normal  Mammogram:  Overdue  Colorectal cancer screening:  Colonoscopy 2020, polyps removed, repeat 5 years  DEXA scan:  3/6/2020  Vaccines due?  Needs Shingrix    Sexually active:  Yes, no concerns    Menopause symptoms:  No hot flushes or night sweats  Urinary leakage:  No    Med Refill  Associated symptoms include arthralgias, congestion (off and on) and weakness (right leg). Pertinent negatives include no abdominal pain, chest pain, coughing, fatigue, headaches, myalgias, nausea, numbness, rash, sore throat or vomiting.       Review of Systems   Constitutional:  Positive for unexpected weight change (gaining weight due to inactivity). Negative for fatigue.   HENT:  Positive for congestion (off and on). Negative for ear pain, rhinorrhea and sore throat.    Eyes:  Negative for pain and visual disturbance.   Respiratory:  Positive for shortness of breath (with exertion). Negative for cough.    Cardiovascular:  Negative for chest pain and palpitations.   Gastrointestinal:  Positive for constipation. Negative for abdominal pain, blood in stool, diarrhea, nausea and vomiting.   Genitourinary:  Negative for dysuria, frequency, vaginal bleeding and vaginal discharge.   Musculoskeletal:  Positive for arthralgias and back pain. Negative for myalgias.   Skin:  Negative  "for rash.   Neurological:  Positive for weakness (right leg). Negative for dizziness, numbness and headaches.   Psychiatric/Behavioral:  Negative for dysphoric mood and sleep disturbance. The patient is not nervous/anxious.        Objective     /82   Pulse 91   Ht 1.626 m (5' 4\")   Wt 97.1 kg (214 lb)   SpO2 95%   BMI 36.73 kg/m²     Physical Exam  Constitutional:       General: She is not in acute distress.     Appearance: She is obese.   HENT:      Right Ear: Tympanic membrane normal.      Left Ear: Tympanic membrane normal.   Neck:      Thyroid: No thyromegaly.   Cardiovascular:      Rate and Rhythm: Normal rate and regular rhythm.      Heart sounds: No murmur heard.  Pulmonary:      Effort: No respiratory distress.      Breath sounds: Normal breath sounds.   Chest:   Breasts:     Breasts are symmetrical.      Right: No mass, nipple discharge, skin change or tenderness.      Left: No mass, nipple discharge, skin change or tenderness.   Abdominal:      General: Bowel sounds are normal. There is no distension.      Palpations: Abdomen is soft. There is no hepatomegaly or splenomegaly.      Tenderness: There is no abdominal tenderness.   Musculoskeletal:         General: No swelling or tenderness.      Comments: Gait is antalgic, walks with a cane.   Lymphadenopathy:      Cervical: No cervical adenopathy.      Upper Body:      Right upper body: No axillary adenopathy.      Left upper body: No axillary adenopathy.   Skin:     General: Skin is warm and dry.      Coloration: Skin is not jaundiced.      Findings: No rash.   Neurological:      General: No focal deficit present.      Mental Status: She is alert and oriented to person, place, and time.   Psychiatric:         Mood and Affect: Mood normal.         Behavior: Behavior normal.             Assessment/Plan   Problem List Items Addressed This Visit       Depression    Relevant Medications    buPROPion XL (Wellbutrin XL) 300 mg 24 hr tablet    " Hashimoto's thyroiditis    Relevant Orders    TSH with reflex to Free T4 if abnormal    Hyperlipidemia, mixed    Relevant Orders    Comprehensive Metabolic Panel    Apolipoprotein B    Lipid Panel    Uric Acid    Hypertension, benign    Relevant Orders    Homocysteine    C-Reactive Protein, High Sensitivity    Albumin , Urine Random    Impaired fasting glucose    Current Assessment & Plan     She is prediabetic, will check labs and consider adding metformin.  Handout sent to her [employer] portal.         Relevant Orders    POCT glycosylated hemoglobin (Hb A1C) manually resulted (Completed)    Vitamin D deficiency    Relevant Orders    Vitamin D 25-Hydroxy,Total (for eval of Vitamin D levels)    Class 2 severe obesity with serious comorbidity and body mass index (BMI) of 36.0 to 36.9 in adult (Multi)     Other Visit Diagnoses       Routine general medical examination at health care facility    -  Primary    Screening for cardiovascular condition        Relevant Orders    ECG 12 lead (Completed)    Need for vaccination        Relevant Medications    zoster vaccine-recombinant adjuvanted (Shingrix) 50 mcg/0.5 mL vaccine    Asymptomatic menopausal state        Relevant Orders    XR DEXA bone density    Encounter for screening mammogram for breast cancer        Relevant Orders    BI mammo bilateral screening tomosynthesis

## 2024-05-17 LAB — APO B100 SERPL-MCNC: 78 MG/DL (ref 60–117)

## 2024-05-20 DIAGNOSIS — E78.2 HYPERLIPIDEMIA, MIXED: ICD-10-CM

## 2024-05-20 DIAGNOSIS — R79.83 HYPERHOMOCYSTINEMIA: Primary | ICD-10-CM

## 2024-05-20 DIAGNOSIS — E06.3 HASHIMOTO'S THYROIDITIS: ICD-10-CM

## 2024-05-20 DIAGNOSIS — I10 HYPERTENSION, BENIGN: ICD-10-CM

## 2024-05-20 RX ORDER — HYDROCHLOROTHIAZIDE 25 MG/1
25 TABLET ORAL DAILY
Qty: 90 TABLET | Refills: 1 | Status: SHIPPED | OUTPATIENT
Start: 2024-05-20 | End: 2024-11-16

## 2024-05-20 RX ORDER — SIMVASTATIN 40 MG/1
40 TABLET, FILM COATED ORAL DAILY
Qty: 90 TABLET | Refills: 1 | Status: SHIPPED | OUTPATIENT
Start: 2024-05-20

## 2024-05-20 RX ORDER — THYROID 120 MG/1
120 TABLET ORAL
Qty: 30 TABLET | Refills: 1 | Status: SHIPPED | OUTPATIENT
Start: 2024-05-20 | End: 2024-07-19

## 2024-05-20 RX ORDER — FOLIC ACID 1 MG/1
1 TABLET ORAL DAILY
Qty: 90 TABLET | Refills: 3 | Status: SHIPPED | OUTPATIENT
Start: 2024-05-20 | End: 2025-05-20

## 2024-06-13 ENCOUNTER — APPOINTMENT (OUTPATIENT)
Dept: RADIOLOGY | Facility: CLINIC | Age: 64
End: 2024-06-13
Payer: MEDICARE

## 2024-06-17 ENCOUNTER — APPOINTMENT (OUTPATIENT)
Dept: RADIOLOGY | Facility: CLINIC | Age: 64
End: 2024-06-17
Payer: MEDICARE

## 2024-06-21 ENCOUNTER — LAB (OUTPATIENT)
Dept: LAB | Facility: LAB | Age: 64
End: 2024-06-21
Payer: MEDICARE

## 2024-06-21 ENCOUNTER — HOSPITAL ENCOUNTER (OUTPATIENT)
Dept: RADIOLOGY | Facility: CLINIC | Age: 64
Discharge: HOME | End: 2024-06-21
Payer: MEDICARE

## 2024-06-21 DIAGNOSIS — Z78.0 ASYMPTOMATIC MENOPAUSAL STATE: ICD-10-CM

## 2024-06-21 DIAGNOSIS — E06.3 HASHIMOTO'S THYROIDITIS: ICD-10-CM

## 2024-06-21 LAB
T4 FREE SERPL-MCNC: 1.19 NG/DL (ref 0.78–1.48)
TSH SERPL-ACNC: 0.24 MIU/L (ref 0.44–3.98)

## 2024-06-21 PROCEDURE — 84439 ASSAY OF FREE THYROXINE: CPT

## 2024-06-21 PROCEDURE — 84443 ASSAY THYROID STIM HORMONE: CPT

## 2024-06-21 PROCEDURE — 36415 COLL VENOUS BLD VENIPUNCTURE: CPT

## 2024-06-21 PROCEDURE — 77080 DXA BONE DENSITY AXIAL: CPT | Performed by: STUDENT IN AN ORGANIZED HEALTH CARE EDUCATION/TRAINING PROGRAM

## 2024-06-21 PROCEDURE — 77080 DXA BONE DENSITY AXIAL: CPT

## 2024-06-24 ENCOUNTER — TELEPHONE (OUTPATIENT)
Dept: PRIMARY CARE | Facility: CLINIC | Age: 64
End: 2024-06-24

## 2024-06-24 DIAGNOSIS — E06.3 HASHIMOTO'S THYROIDITIS: ICD-10-CM

## 2024-06-24 DIAGNOSIS — I10 HYPERTENSION, BENIGN: ICD-10-CM

## 2024-06-24 RX ORDER — LISINOPRIL 10 MG/1
10 TABLET ORAL DAILY
Qty: 90 TABLET | Refills: 1 | Status: SHIPPED | OUTPATIENT
Start: 2024-06-24

## 2024-06-24 RX ORDER — THYROID 60 MG/1
120 TABLET ORAL
Qty: 180 TABLET | Refills: 1 | Status: SHIPPED | OUTPATIENT
Start: 2024-06-24 | End: 2024-12-21

## 2024-06-24 NOTE — TELEPHONE ENCOUNTER
Shy WU From [facility] called with some clinical questions for PA for NP Thyroid. [phone] Ref #[id number]

## 2024-07-30 ENCOUNTER — TELEPHONE (OUTPATIENT)
Dept: PRIMARY CARE | Facility: CLINIC | Age: 64
End: 2024-07-30
Payer: MEDICARE

## 2024-07-30 NOTE — TELEPHONE ENCOUNTER
Called to schedule her mammogram and also a BP F/U visit for November, no answer, left message on voicemail.

## 2024-08-19 DIAGNOSIS — M54.17 LUMBOSACRAL RADICULITIS: ICD-10-CM

## 2024-08-20 RX ORDER — GABAPENTIN 300 MG/1
CAPSULE ORAL
Qty: 270 CAPSULE | Refills: 0 | OUTPATIENT
Start: 2024-08-20

## 2024-08-21 ENCOUNTER — TELEPHONE (OUTPATIENT)
Dept: PRIMARY CARE | Facility: CLINIC | Age: 64
End: 2024-08-21
Payer: MEDICARE

## 2024-08-21 NOTE — TELEPHONE ENCOUNTER
----- Message from Maureen Mendoza sent at 8/21/2024  1:00 PM EDT -----  Please call to schedule BP F/U visit.  Thanks.

## 2024-08-23 ENCOUNTER — HOSPITAL ENCOUNTER (OUTPATIENT)
Dept: RADIOLOGY | Facility: CLINIC | Age: 64
Discharge: HOME | End: 2024-08-23
Payer: MEDICARE

## 2024-08-23 VITALS — WEIGHT: 214 LBS | HEIGHT: 64 IN | BODY MASS INDEX: 36.54 KG/M2

## 2024-08-23 DIAGNOSIS — Z12.31 ENCOUNTER FOR SCREENING MAMMOGRAM FOR BREAST CANCER: ICD-10-CM

## 2024-08-23 PROCEDURE — 77067 SCR MAMMO BI INCL CAD: CPT

## 2024-09-01 DIAGNOSIS — M54.17 LUMBOSACRAL RADICULITIS: ICD-10-CM

## 2024-09-03 RX ORDER — GABAPENTIN 300 MG/1
CAPSULE ORAL
Qty: 270 CAPSULE | Refills: 0 | OUTPATIENT
Start: 2024-09-03

## 2024-09-04 ENCOUNTER — TELEPHONE (OUTPATIENT)
Dept: PRIMARY CARE | Facility: CLINIC | Age: 64
End: 2024-09-04
Payer: MEDICARE

## 2024-09-16 DIAGNOSIS — M54.17 LUMBOSACRAL RADICULITIS: ICD-10-CM

## 2024-09-16 RX ORDER — GABAPENTIN 300 MG/1
CAPSULE ORAL
Qty: 270 CAPSULE | Refills: 1 | Status: SHIPPED | OUTPATIENT
Start: 2024-09-16

## 2024-11-12 ENCOUNTER — APPOINTMENT (OUTPATIENT)
Dept: PRIMARY CARE | Facility: CLINIC | Age: 64
End: 2024-11-12
Payer: MEDICARE

## 2024-11-18 ENCOUNTER — APPOINTMENT (OUTPATIENT)
Dept: PRIMARY CARE | Facility: CLINIC | Age: 64
End: 2024-11-18
Payer: MEDICARE

## 2024-11-18 ENCOUNTER — LAB (OUTPATIENT)
Dept: LAB | Facility: LAB | Age: 64
End: 2024-11-18
Payer: MEDICARE

## 2024-11-18 VITALS
WEIGHT: 204.2 LBS | HEART RATE: 94 BPM | SYSTOLIC BLOOD PRESSURE: 123 MMHG | OXYGEN SATURATION: 97 % | BODY MASS INDEX: 34.86 KG/M2 | HEIGHT: 64 IN | DIASTOLIC BLOOD PRESSURE: 81 MMHG

## 2024-11-18 DIAGNOSIS — E06.3 HASHIMOTO'S THYROIDITIS: ICD-10-CM

## 2024-11-18 DIAGNOSIS — I10 HYPERTENSION, BENIGN: ICD-10-CM

## 2024-11-18 DIAGNOSIS — I10 HYPERTENSION, BENIGN: Primary | ICD-10-CM

## 2024-11-18 DIAGNOSIS — E78.2 HYPERLIPIDEMIA, MIXED: ICD-10-CM

## 2024-11-18 DIAGNOSIS — Z23 NEED FOR VACCINATION: ICD-10-CM

## 2024-11-18 DIAGNOSIS — J32.9 CHRONIC CONGESTION OF PARANASAL SINUS: ICD-10-CM

## 2024-11-18 DIAGNOSIS — R73.01 IMPAIRED FASTING GLUCOSE: ICD-10-CM

## 2024-11-18 PROBLEM — N95.1 HOT FLASHES, MENOPAUSAL: Status: RESOLVED | Noted: 2023-04-09 | Resolved: 2024-11-18

## 2024-11-18 LAB
ALBUMIN SERPL BCP-MCNC: 4.2 G/DL (ref 3.4–5)
ALP SERPL-CCNC: 106 U/L (ref 33–136)
ALT SERPL W P-5'-P-CCNC: 27 U/L (ref 7–45)
ANION GAP SERPL CALC-SCNC: 13 MMOL/L (ref 10–20)
AST SERPL W P-5'-P-CCNC: 26 U/L (ref 9–39)
BILIRUB SERPL-MCNC: 0.5 MG/DL (ref 0–1.2)
BUN SERPL-MCNC: 12 MG/DL (ref 6–23)
CALCIUM SERPL-MCNC: 10.5 MG/DL (ref 8.6–10.6)
CHLORIDE SERPL-SCNC: 102 MMOL/L (ref 98–107)
CHOLEST SERPL-MCNC: 128 MG/DL (ref 0–199)
CHOLESTEROL/HDL RATIO: 3
CO2 SERPL-SCNC: 32 MMOL/L (ref 21–32)
CREAT SERPL-MCNC: 0.83 MG/DL (ref 0.5–1.05)
EGFRCR SERPLBLD CKD-EPI 2021: 78 ML/MIN/1.73M*2
GLUCOSE SERPL-MCNC: 106 MG/DL (ref 74–99)
HDLC SERPL-MCNC: 42 MG/DL
LDLC SERPL CALC-MCNC: 55 MG/DL
NON HDL CHOLESTEROL: 86 MG/DL (ref 0–149)
POC HEMOGLOBIN A1C: 5.9 % (ref 4.2–6.5)
POTASSIUM SERPL-SCNC: 3.7 MMOL/L (ref 3.5–5.3)
PROT SERPL-MCNC: 7 G/DL (ref 6.4–8.2)
SODIUM SERPL-SCNC: 143 MMOL/L (ref 136–145)
T4 FREE SERPL-MCNC: 1.27 NG/DL (ref 0.78–1.48)
TRIGL SERPL-MCNC: 153 MG/DL (ref 0–149)
TSH SERPL-ACNC: 0.06 MIU/L (ref 0.44–3.98)
URATE SERPL-MCNC: 7.5 MG/DL (ref 2.3–6.7)
VLDL: 31 MG/DL (ref 0–40)

## 2024-11-18 PROCEDURE — 3008F BODY MASS INDEX DOCD: CPT | Performed by: FAMILY MEDICINE

## 2024-11-18 PROCEDURE — 99214 OFFICE O/P EST MOD 30 MIN: CPT | Performed by: FAMILY MEDICINE

## 2024-11-18 PROCEDURE — 80053 COMPREHEN METABOLIC PANEL: CPT

## 2024-11-18 PROCEDURE — 84443 ASSAY THYROID STIM HORMONE: CPT

## 2024-11-18 PROCEDURE — 3074F SYST BP LT 130 MM HG: CPT | Performed by: FAMILY MEDICINE

## 2024-11-18 PROCEDURE — 1159F MED LIST DOCD IN RCRD: CPT | Performed by: FAMILY MEDICINE

## 2024-11-18 PROCEDURE — 3079F DIAST BP 80-89 MM HG: CPT | Performed by: FAMILY MEDICINE

## 2024-11-18 PROCEDURE — 90677 PCV20 VACCINE IM: CPT | Performed by: FAMILY MEDICINE

## 2024-11-18 PROCEDURE — 84439 ASSAY OF FREE THYROXINE: CPT

## 2024-11-18 PROCEDURE — 80061 LIPID PANEL: CPT

## 2024-11-18 PROCEDURE — 84550 ASSAY OF BLOOD/URIC ACID: CPT

## 2024-11-18 PROCEDURE — 36415 COLL VENOUS BLD VENIPUNCTURE: CPT

## 2024-11-18 PROCEDURE — G2211 COMPLEX E/M VISIT ADD ON: HCPCS | Performed by: FAMILY MEDICINE

## 2024-11-18 PROCEDURE — 1160F RVW MEDS BY RX/DR IN RCRD: CPT | Performed by: FAMILY MEDICINE

## 2024-11-18 PROCEDURE — 83036 HEMOGLOBIN GLYCOSYLATED A1C: CPT | Performed by: FAMILY MEDICINE

## 2024-11-18 PROCEDURE — G0009 ADMIN PNEUMOCOCCAL VACCINE: HCPCS | Performed by: FAMILY MEDICINE

## 2024-11-18 PROCEDURE — 1123F ACP DISCUSS/DSCN MKR DOCD: CPT | Performed by: FAMILY MEDICINE

## 2024-11-18 PROCEDURE — 1158F ADVNC CARE PLAN TLK DOCD: CPT | Performed by: FAMILY MEDICINE

## 2024-11-18 RX ORDER — FLUTICASONE PROPIONATE 50 MCG
1 SPRAY, SUSPENSION (ML) NASAL DAILY
Qty: 16 G | Refills: 5 | Status: SHIPPED | OUTPATIENT
Start: 2024-11-18 | End: 2025-11-18

## 2024-11-18 ASSESSMENT — ENCOUNTER SYMPTOMS
SHORTNESS OF BREATH: 0
UNEXPECTED WEIGHT CHANGE: 0
PALPITATIONS: 0
FATIGUE: 0
ARTHRALGIAS: 1
BACK PAIN: 1
COUGH: 0

## 2024-11-18 ASSESSMENT — PATIENT HEALTH QUESTIONNAIRE - PHQ9
1. LITTLE INTEREST OR PLEASURE IN DOING THINGS: NOT AT ALL
SUM OF ALL RESPONSES TO PHQ9 QUESTIONS 1 & 2: 0
2. FEELING DOWN, DEPRESSED OR HOPELESS: NOT AT ALL

## 2024-11-18 NOTE — PROGRESS NOTES
"Subjective   Patient ID: Kristyn Quinones is a 64 y.o. female who presents for Follow-up (BP).    [facility] is here to follow up on her blood pressure.  She has not been monitoring it at home.  She is having more success with walking, her pain has decreased.      She has been having persistent trouble with sinus congestion, has tried OTC sinus pills with no relief.  She has tried sinus rinses with only transient relief.  No fevers.  No cough.          Review of Systems   Constitutional:  Negative for fatigue and unexpected weight change.   HENT:  Positive for congestion.    Respiratory:  Negative for cough and shortness of breath.    Cardiovascular:  Negative for chest pain and palpitations.   Musculoskeletal:  Positive for arthralgias and back pain.       Objective     /81   Pulse 94   Ht 1.626 m (5' 4.02\")   Wt 92.6 kg (204 lb 3.2 oz)   SpO2 97%   BMI 35.03 kg/m²     Physical Exam  Constitutional:       General: She is not in acute distress.     Appearance: She is obese.   HENT:      Nose: Mucosal edema, congestion and rhinorrhea present. Rhinorrhea is clear.   Neck:      Thyroid: No thyromegaly.   Cardiovascular:      Rate and Rhythm: Normal rate and regular rhythm.      Heart sounds: No murmur heard.  Pulmonary:      Effort: No respiratory distress.      Breath sounds: Normal breath sounds.   Abdominal:      General: Bowel sounds are normal. There is no distension.      Palpations: Abdomen is soft. There is no hepatomegaly or splenomegaly.   Lymphadenopathy:      Cervical: No cervical adenopathy.   Neurological:      Mental Status: She is alert.             Assessment/Plan   Problem List Items Addressed This Visit       Hashimoto's thyroiditis    Relevant Orders    TSH with reflex to Free T4 if abnormal    Hyperlipidemia, mixed    Relevant Orders    Comprehensive Metabolic Panel    Lipid Panel    Hypertension, benign - Primary    Current Assessment & Plan     Blood pressure is controlled, will check " labs and plan to continue the same medications.         Relevant Orders    Uric Acid    Impaired fasting glucose    Relevant Orders    POCT glycosylated hemoglobin (Hb A1C) manually resulted (Completed)     Other Visit Diagnoses       Need for vaccination        Relevant Orders    Pneumococcal conjugate vaccine, 20-valent (PREVNAR 20) (Completed)    Chronic congestion of paranasal sinus        Relevant Medications    fluticasone (Flonase) 50 mcg/actuation nasal spray

## 2024-11-19 DIAGNOSIS — I10 HYPERTENSION, BENIGN: ICD-10-CM

## 2024-11-19 DIAGNOSIS — E78.2 HYPERLIPIDEMIA, MIXED: ICD-10-CM

## 2024-11-19 DIAGNOSIS — E06.3 HASHIMOTO'S THYROIDITIS: ICD-10-CM

## 2024-11-19 RX ORDER — SIMVASTATIN 40 MG/1
40 TABLET, FILM COATED ORAL DAILY
Qty: 90 TABLET | Refills: 1 | Status: SHIPPED | OUTPATIENT
Start: 2024-11-19

## 2024-11-19 RX ORDER — LISINOPRIL 20 MG/1
20 TABLET ORAL DAILY
Qty: 30 TABLET | Refills: 1 | Status: SHIPPED | OUTPATIENT
Start: 2024-11-19 | End: 2025-01-18

## 2024-11-19 RX ORDER — THYROID 15 MG/1
TABLET ORAL
Qty: 30 TABLET | Refills: 1 | Status: SHIPPED | OUTPATIENT
Start: 2024-11-19

## 2024-11-19 RX ORDER — THYROID 90 MG/1
90 TABLET ORAL
Qty: 30 TABLET | Refills: 1 | Status: SHIPPED | OUTPATIENT
Start: 2024-11-19 | End: 2025-01-18

## 2025-01-09 ENCOUNTER — LAB (OUTPATIENT)
Dept: LAB | Facility: LAB | Age: 65
End: 2025-01-09
Payer: MEDICARE

## 2025-01-09 DIAGNOSIS — E06.3 HASHIMOTO'S THYROIDITIS: ICD-10-CM

## 2025-01-09 LAB
T4 FREE SERPL-MCNC: 1.04 NG/DL (ref 0.78–1.48)
TSH SERPL-ACNC: 0.08 MIU/L (ref 0.44–3.98)

## 2025-01-09 PROCEDURE — 84443 ASSAY THYROID STIM HORMONE: CPT

## 2025-01-09 PROCEDURE — 84439 ASSAY OF FREE THYROXINE: CPT

## 2025-01-09 RX ORDER — THYROID 90 MG/1
90 TABLET ORAL
Qty: 90 TABLET | Refills: 1 | Status: SHIPPED | OUTPATIENT
Start: 2025-01-09 | End: 2025-07-08

## 2025-02-14 ENCOUNTER — APPOINTMENT (OUTPATIENT)
Dept: PRIMARY CARE | Facility: CLINIC | Age: 65
End: 2025-02-14
Payer: MEDICARE

## 2025-02-28 ENCOUNTER — OFFICE VISIT (OUTPATIENT)
Dept: PRIMARY CARE | Facility: CLINIC | Age: 65
End: 2025-02-28
Payer: MEDICARE

## 2025-02-28 ENCOUNTER — TELEPHONE (OUTPATIENT)
Dept: PRIMARY CARE | Facility: CLINIC | Age: 65
End: 2025-02-28

## 2025-02-28 VITALS
HEIGHT: 64 IN | DIASTOLIC BLOOD PRESSURE: 84 MMHG | BODY MASS INDEX: 35.48 KG/M2 | OXYGEN SATURATION: 99 % | HEART RATE: 103 BPM | WEIGHT: 207.8 LBS | SYSTOLIC BLOOD PRESSURE: 140 MMHG

## 2025-02-28 DIAGNOSIS — M54.17 LUMBOSACRAL RADICULITIS: ICD-10-CM

## 2025-02-28 DIAGNOSIS — E55.9 VITAMIN D DEFICIENCY: ICD-10-CM

## 2025-02-28 DIAGNOSIS — I83.11 VARICOSE VEINS OF BOTH LOWER EXTREMITIES WITH INFLAMMATION: ICD-10-CM

## 2025-02-28 DIAGNOSIS — E06.3 HASHIMOTO'S THYROIDITIS: ICD-10-CM

## 2025-02-28 DIAGNOSIS — I83.12 VARICOSE VEINS OF BOTH LOWER EXTREMITIES WITH INFLAMMATION: ICD-10-CM

## 2025-02-28 DIAGNOSIS — E79.0 ELEVATED BLOOD URIC ACID LEVEL: ICD-10-CM

## 2025-02-28 DIAGNOSIS — I10 HYPERTENSION, BENIGN: Primary | ICD-10-CM

## 2025-02-28 DIAGNOSIS — R73.01 IMPAIRED FASTING GLUCOSE: ICD-10-CM

## 2025-02-28 PROCEDURE — 1123F ACP DISCUSS/DSCN MKR DOCD: CPT | Performed by: NURSE PRACTITIONER

## 2025-02-28 PROCEDURE — 3077F SYST BP >= 140 MM HG: CPT | Performed by: NURSE PRACTITIONER

## 2025-02-28 PROCEDURE — 99214 OFFICE O/P EST MOD 30 MIN: CPT | Performed by: NURSE PRACTITIONER

## 2025-02-28 PROCEDURE — 1159F MED LIST DOCD IN RCRD: CPT | Performed by: NURSE PRACTITIONER

## 2025-02-28 PROCEDURE — 3008F BODY MASS INDEX DOCD: CPT | Performed by: NURSE PRACTITIONER

## 2025-02-28 PROCEDURE — 3079F DIAST BP 80-89 MM HG: CPT | Performed by: NURSE PRACTITIONER

## 2025-02-28 RX ORDER — LISINOPRIL 20 MG/1
20 TABLET ORAL DAILY
Qty: 30 TABLET | Refills: 1 | Status: SHIPPED | OUTPATIENT
Start: 2025-02-28 | End: 2025-04-29

## 2025-02-28 RX ORDER — GABAPENTIN 300 MG/1
600 CAPSULE ORAL 2 TIMES DAILY
Qty: 360 CAPSULE | Refills: 0 | Status: SHIPPED | OUTPATIENT
Start: 2025-02-28

## 2025-02-28 RX ORDER — GABAPENTIN 300 MG/1
600 CAPSULE ORAL 2 TIMES DAILY
Qty: 360 CAPSULE | Refills: 0 | Status: SHIPPED | OUTPATIENT
Start: 2025-02-28 | End: 2025-02-28 | Stop reason: SDUPTHER

## 2025-02-28 ASSESSMENT — ENCOUNTER SYMPTOMS
BACK PAIN: 1
NEUROLOGICAL NEGATIVE: 1
ROS GI COMMENTS: NO HEARTBURN
SLEEP DISTURBANCE: 0
RESPIRATORY NEGATIVE: 1
GASTROINTESTINAL NEGATIVE: 1
CARDIOVASCULAR NEGATIVE: 1

## 2025-02-28 NOTE — PATIENT INSTRUCTIONS
Increase Gabapentin 600 mg twice daily ( from 300 mg am and 600 mg)  Repeat thyroid lab, checking A1C and vitamin D  Monitor BP at home   Referral to endocrinology

## 2025-02-28 NOTE — TELEPHONE ENCOUNTER
Pharmacist called requesting for clarification on rx gabapentin.     First set of directions say take 2 caps PO BID.   Second set of directions say: Take 1 cap PO AM and 2 caps PO HS.     They need it corrected and sent over for their system please.     [facility] pharmacy verified on file.

## 2025-02-28 NOTE — PROGRESS NOTES
"Subjective   Patient ID: Kristyn Quinones is a 64 y.o. female who presents for Establish Care (New pt here to get est care. Previous pcp Dr Mendoza who recently Retired. Lov 11/18/24 along with labs. /Pt will need lisinopril 20 mg refill (90 day). All other meds she still has a 90 day refill at the pharmacy. ).    HPI   Patient here to establish. Previous provider Dr Mendoza seen on 11/18/2024.  Hx of broken thoracic vertebra & coccyx, 2020- tail bone still continues to cause pain sitting and walking.   Current concern  1) HTN- had been out of Lisinopril, but used her husbands 20 mg dose.   2) Hashimoto's thyroiditis-  labs in 11/2024 TSH 0.06, instructed by PCP to drop the dose to 90 mg with repeat labs in 6 months.   Chronic concerns: Anemia, fatty liver, depression, Hyperlipidemia, IFG, vitamin d deficiency, Hashimoto's thyroiditis, Lumborsacal radiculitis, elevated uric acid,  Specialist  Labs  Former smoker 1/2 ppd 17 years (8.5 pack per year average)  Diet - does drink some calcium       Review of Systems   Respiratory: Negative.     Cardiovascular: Negative.    Gastrointestinal: Negative.         No heartburn    Musculoskeletal:  Positive for back pain (tail bone pain- sits on dounut).   Neurological: Negative.    Psychiatric/Behavioral:  Negative for sleep disturbance (denies snoring).         Brain fog        Objective   /84 (BP Location: Left arm, Patient Position: Sitting, BP Cuff Size: Large adult)   Pulse 103   Ht 1.626 m (5' 4.02\")   Wt 94.3 kg (207 lb 12.8 oz)   SpO2 99%   BMI 35.65 kg/m²       Physical Exam  Vitals reviewed.   Cardiovascular:      Rate and Rhythm: Normal rate and regular rhythm.      Heart sounds: Normal heart sounds.      Comments: Varicose- spider veins throughout lower legs, hemosiderin staining -inflammation of skin lower extremities.      Pulmonary:      Effort: Pulmonary effort is normal.      Breath sounds: Normal breath sounds.   Musculoskeletal:         General: " Normal range of motion.      Right lower leg: No edema.      Left lower leg: No edema.   Skin:     General: Skin is warm.   Neurological:      General: No focal deficit present.      Mental Status: She is alert.   Psychiatric:         Mood and Affect: Mood normal.         Behavior: Behavior normal.       Assessment/Plan   Health Maintenance  Labs- 01/18/2025, 11/18/2024 reviewed labs with patient   Tdap/ Td 02/27/2018  RSV 05/15/2024  Influenza- ?  Prevnar 20   11/18/2024  Shingrix- not completed per EMR  Colonoscopy  06/09/2020- repeat in 10 years. Discussed Cologuard  Cervical Cancer screen - total hysterectomy 2016   Mammogram 08/12/2024  DEXA BONE Density 06/21/2024 osteopenia (left femur neck)   CT abdomen/pelvis  (repeated) stable 2.4 cm cyst left hepatic lobe- mild hepatic steatosis  09/22/2019  Diagnoses and all orders for this visit:  Hypertension, benign  BP elevated in office today, does not routinely check BP at home  Recommend checking and bring log to next office visit.  Had been on hydrochlorothiazide previously but did have elevated uric acid, previous PCP discontinued hydrochlorothiazide and increased dose of Lisinopril  Patient denies symptoms of gout.   - refilled  lisinopril 20 mg tablet; Take 1 tablet (20 mg) by mouth once daily.  Impaired fasting glucose  Cmp 11/18/2024 glucose 106  -     Hemoglobin A1C; Future  Elevated blood uric acid level  11/18/2024 uric acid 7.5.   Hashimoto's thyroiditis  / Body mass index (BMI) 35.0-35.9, adult  BMI 35.65, weight today 207.12 lbs   01/09/2025 0.08.  instructed to decrease NP thyroid dose 90 mg with repeat labs in 6 months.  Discussed with patient why rx pork thyroid vs Levothyroxine. Patient is not sure, denies any reaction to any medication.   Patient is worried about weight gain, hair loss and chronic fatigue since dx with Hashimoto's interested in GLP-1 medications for weight loss. Discussed not rx such medications unless A1C indicating. Will make  referral to Endo to discuss prescribing levothyroxine vs current NP thyroid, possible rx GLP-a if considered approopriate  -     TSH with reflex to Free T4 if abnormal; Future  -     Referral to Endocrinology; Future  Dr. WADE,    Vitamin D deficiency  -     Vitamin D 25-Hydroxy,Total (for eval of Vitamin D levels); Future  Lumbosacral radiculitis:   Coccyx pain reported by patient is more concerning   Discussed increasing dose based on pain control does not make her tired.  Will increase Gabapentin to 600 mg in am and continue on 600 mg at night.   -     gabapentin (Neurontin) 300 mg capsule; Take 2 capsules (600 mg) by mouth 2 times a day. TAKE 1 CAPSULE BY MOUTH IN THE MORNING AND 2 AT BEDTIME  Varicose veins of both lower extremities with inflammation  Had been treated several years ago by vascular specialist, does wear compression knee high stockings. Monitors sodium. Continues to have achy feeling with lower legs     PLAN:  follow up one month  Increase Gabapentin 600 mg twice daily ( from 300 mg am and 600 mg)  Repeat thyroid lab, checking A1C and vitamin D  Monitor BP at home   Referral to endocrinology

## 2025-03-01 LAB
25(OH)D3+25(OH)D2 SERPL-MCNC: 25 NG/ML (ref 30–100)
EST. AVERAGE GLUCOSE BLD GHB EST-MCNC: 128 MG/DL
EST. AVERAGE GLUCOSE BLD GHB EST-SCNC: 7.1 MMOL/L
HBA1C MFR BLD: 6.1 % OF TOTAL HGB
T4 FREE SERPL-MCNC: 1.2 NG/DL (ref 0.8–1.8)
TSH SERPL-ACNC: 0.15 MIU/L (ref 0.4–4.5)

## 2025-03-03 ENCOUNTER — TELEPHONE (OUTPATIENT)
Dept: PRIMARY CARE | Facility: CLINIC | Age: 65
End: 2025-03-03
Payer: MEDICARE

## 2025-03-03 RX ORDER — THYROID 60 MG/1
60 TABLET ORAL
Qty: 30 TABLET | Refills: 1 | Status: SHIPPED | OUTPATIENT
Start: 2025-03-03

## 2025-03-03 NOTE — RESULT ENCOUNTER NOTE
TSH remains too low, slightly increased from previous level, but not within normal range. Submitted decreased dose of NP Thyroid 65 mg, take for 6 weeks and repeat TSH lab. Order in EMR.  A1C is 6.1%, within the prediabetic range (5.7-6.4) recommend monitoring diet for both complex and simple carbohydrate.    Vitamin D is 25, insufficient range - recommend taking otc vitamin D 2000 units daily.

## 2025-03-03 NOTE — TELEPHONE ENCOUNTER
Pharmacy called stating fr pt's thyroid rx, the strength that the rx comes in that they have is 15 mg, 30 mg, and 60 mg. They don't carry it for 65 mg. Which ever strength you'd like her to have they will need the script corrected and resubmitted to them.     Lov 2/28/25  Nov 3/28/25

## 2025-03-14 ENCOUNTER — APPOINTMENT (OUTPATIENT)
Dept: ENDOCRINOLOGY | Facility: CLINIC | Age: 65
End: 2025-03-14
Payer: MEDICARE

## 2025-03-28 ENCOUNTER — APPOINTMENT (OUTPATIENT)
Dept: PRIMARY CARE | Facility: CLINIC | Age: 65
End: 2025-03-28
Payer: MEDICARE

## 2025-04-20 DIAGNOSIS — E06.3 HASHIMOTO'S THYROIDITIS: ICD-10-CM

## 2025-04-30 LAB
T4 FREE SERPL-MCNC: 0.8 NG/DL (ref 0.8–1.8)
TSH SERPL-ACNC: 5.83 MIU/L (ref 0.4–4.5)

## 2025-05-01 DIAGNOSIS — R79.89 ABNORMAL TSH: Primary | ICD-10-CM

## 2025-05-01 DIAGNOSIS — E06.3 HASHIMOTO'S THYROIDITIS: ICD-10-CM

## 2025-05-01 RX ORDER — THYROID 60 MG/1
60 TABLET ORAL
Qty: 30 TABLET | Refills: 1 | Status: SHIPPED | OUTPATIENT
Start: 2025-05-01

## 2025-05-01 RX ORDER — THYROID 15 MG/1
15 TABLET ORAL 2 TIMES WEEKLY
Qty: 8 TABLET | Refills: 1 | Status: SHIPPED | OUTPATIENT
Start: 2025-05-01

## 2025-05-16 ENCOUNTER — APPOINTMENT (OUTPATIENT)
Facility: CLINIC | Age: 65
End: 2025-05-16
Payer: MEDICARE

## 2025-05-16 VITALS
WEIGHT: 209.4 LBS | SYSTOLIC BLOOD PRESSURE: 122 MMHG | BODY MASS INDEX: 35.75 KG/M2 | RESPIRATION RATE: 16 BRPM | DIASTOLIC BLOOD PRESSURE: 70 MMHG | HEART RATE: 80 BPM | HEIGHT: 64 IN

## 2025-05-16 DIAGNOSIS — E06.3 HASHIMOTO'S THYROIDITIS: ICD-10-CM

## 2025-05-16 PROCEDURE — 1159F MED LIST DOCD IN RCRD: CPT | Performed by: INTERNAL MEDICINE

## 2025-05-16 PROCEDURE — 3074F SYST BP LT 130 MM HG: CPT | Performed by: INTERNAL MEDICINE

## 2025-05-16 PROCEDURE — 3078F DIAST BP <80 MM HG: CPT | Performed by: INTERNAL MEDICINE

## 2025-05-16 PROCEDURE — 3008F BODY MASS INDEX DOCD: CPT | Performed by: INTERNAL MEDICINE

## 2025-05-16 PROCEDURE — 99204 OFFICE O/P NEW MOD 45 MIN: CPT | Performed by: INTERNAL MEDICINE

## 2025-05-16 PROCEDURE — 1160F RVW MEDS BY RX/DR IN RCRD: CPT | Performed by: INTERNAL MEDICINE

## 2025-05-16 RX ORDER — PHENTERMINE AND TOPIRAMATE 7.5; 46 MG/1; MG/1
1 CAPSULE, EXTENDED RELEASE ORAL DAILY
Qty: 30 CAPSULE | Refills: 2 | Status: SHIPPED | OUTPATIENT
Start: 2025-05-16

## 2025-05-16 RX ORDER — THYROID 15 MG/1
15 TABLET ORAL
Qty: 90 TABLET | Refills: 1 | Status: SHIPPED | OUTPATIENT
Start: 2025-05-16 | End: 2026-05-16

## 2025-05-16 RX ORDER — PHENTERMINE AND TOPIRAMATE 3.75; 23 MG/1; MG/1
1 CAPSULE, EXTENDED RELEASE ORAL DAILY
Qty: 14 CAPSULE | Refills: 0 | Status: SHIPPED | OUTPATIENT
Start: 2025-05-16 | End: 2025-05-30

## 2025-05-16 ASSESSMENT — ENCOUNTER SYMPTOMS
FATIGUE: 0
VOMITING: 0
HEADACHES: 0
DIARRHEA: 0
CHILLS: 0
FEVER: 0
PALPITATIONS: 0
SHORTNESS OF BREATH: 0
NAUSEA: 0
COUGH: 0

## 2025-05-16 NOTE — PATIENT INSTRUCTIONS
Work on low-carb high-protein low calorie diet and increasing activity  Take thyroid medication in the morning on an empty stomach.  Take thyroid medication 75 mg daily  Repeat thyroid blood work in 2 months  Start Qsymia for weight loss as discussed  Follow-up in 3 months

## 2025-05-16 NOTE — ASSESSMENT & PLAN NOTE
Orders:    Referral to Endocrinology    thyroid, pork, ([place]ish Thyroid) 15 mg tablet tablet; Take 1 tablet (15 mg) by mouth once daily in the morning. Take before meals. Twith 60 mg dose = total dose 75 mg daily    TSH with reflex to Free T4 if abnormal; Future

## 2025-05-16 NOTE — LETTER
May 16, 2025     TALITA Nicole  [address]  [facility], [address]  [place] [address]    Patient: Kristyn Quinones   YOB: 1960   Date of Visit: 5/16/2025       Dear TALITA Villeda:    Thank you for referring Kristyn Quinones to me for evaluation. Below are my notes for this consultation.  If you have questions, please do not hesitate to call me. I look forward to following your patient along with you.       Sincerely,     Manuela Neal MD      CC: No Recipients  ______________________________________________________________________________________    Endocrinology New Patient Consult  Subjective  Patient ID: Kristyn Quinones is a 64 y.o. female who presents for Hypothyroidism, Hashimoto's Thyroiditis, and Weight Gain. Patient was referred by TALITA Nicole    PCP: TALITA Nicole    HPI  64-year-old here for evaluation of hypothyroidism and obesity.  Patient states she has been on thyroid medication for about 20 years.  She has been on [place] most of that time due to lack of feeling well on levothyroxine alone.  She has had issues with fluctuating levels.  Most recently she was on 60 mg and increased to?  Alternating 60 and 75 mg with a TSH of 5.8 at the end of April.  She complains of fatigue and brain fog.  She is also struggled with her weight since menopause and has been unable to lose.  She has been trying to watch her eating habits on and off and exercise but exercise is limited due to arthritic complaints.  She does take her thyroid medicine in the morning with her other medications including vitamins.    I have personally reviewed the OARRS report for EL   . This is recorded in the EMR.  I have considered the risks of abuse, dependence, addiction, and diversion.  I believe that it is clinically appropriate for EL   to be prescribed this medication.        Review of Systems   Constitutional:  Negative for chills, fatigue  and fever.   Respiratory:  Negative for cough and shortness of breath.    Cardiovascular:  Negative for chest pain and palpitations.   Gastrointestinal:  Negative for diarrhea, nausea and vomiting.   Neurological:  Negative for headaches.       Problem List[1]    Medical History[2]     Surgical History[3]     Tobacco Use History[4]   Social History     Substance and Sexual Activity   Alcohol Use Not Currently      Marital status:   Employment: Retired    Family History[5]     Home Meds:  Current Outpatient Medications   Medication Instructions   • buPROPion XL (WELLBUTRIN XL) 300 mg, oral, Daily   • fluticasone (Flonase) 50 mcg/actuation nasal spray 1 spray, Each Nostril, Daily, Shake gently. Before first use, prime pump. After use, clean tip and replace cap.   • folic acid (FOLVITE) 1 mg, oral, Daily   • gabapentin (NEURONTIN) 600 mg, oral, 2 times daily   • lisinopril 20 mg, oral, Daily   • simvastatin (ZOCOR) 40 mg, oral, Daily   • thyroid (pork) (ARMOUR THYROID) 15 mg, oral, 2 times weekly, Take 15 mg [place] every 3 days with 60 mg dose = total dose 75 mg twice weekly.   • thyroid (pork) (ARMOUR) 60 mg, oral, Daily before breakfast        RX Allergies[6]     Objective  Vitals:    05/16/25 1246   BP: 122/70   Pulse: 80   Resp: 16      Vitals:    05/16/25 1246   Weight: 95 kg (209 lb 6.4 oz)      Body mass index is 35.92 kg/m².   Physical Exam  Constitutional:       Appearance: Normal appearance. She is overweight.   HENT:      Head: Normocephalic and atraumatic.   Neck:      Thyroid: No thyroid mass, thyromegaly or thyroid tenderness.   Cardiovascular:      Rate and Rhythm: Normal rate and regular rhythm.      Heart sounds: No murmur heard.     No gallop.   Pulmonary:      Effort: Pulmonary effort is normal.      Breath sounds: Normal breath sounds.   Abdominal:      Palpations: Abdomen is soft.      Comments: benign   Neurological:      General: No focal deficit present.      Mental Status: She is alert  "and oriented to person, place, and time.      Deep Tendon Reflexes: Reflexes are normal and symmetric.   Psychiatric:         Behavior: Behavior is cooperative.         Labs:  Lab Results   Component Value Date    HGBA1C 6.1 (H) 02/28/2025    TSH 5.83 (H) 04/29/2025    FREET4 0.8 04/29/2025      No results found for: \"PR1\", \"THYROIDPAB\", \"TSI\"     Assessment/Plan  Assessment & Plan  Hashimoto's thyroiditis    Orders:  •  Referral to Endocrinology  •  thyroid, pork, ([place] Thyroid) 15 mg tablet tablet; Take 1 tablet (15 mg) by mouth once daily in the morning. Take before meals. Twith 60 mg dose = total dose 75 mg daily  •  TSH with reflex to Free T4 if abnormal; Future    Body mass index (BMI) 35.0-35.9, adult    Orders:  •  Referral to Endocrinology  64-year-old here for evaluation of hypothyroidism and obesity.  We discussed her course in detail.  We discussed the absolute importance of her taking her thyroid medicine in the morning on an empty stomach separately from her other medications.  We also discussed fluctuations with natural formulations such as [place].  We will start with 75 mg every day and recheck blood work in 2 months.  In the meantime we discussed her weight and went over options including weekly injectables versus Qsymia.  She is reluctant to go forward with weekly injectables due to cost but would like to try Qsymia.  We did discuss potential side effects.  She will obtain it through [facility].  I will see her back in 3 months.  I encouraged her to work on diet and exercise    Electronically signed by:  Manuela Neal MD 05/16/25  12:46 PM         [1]  Patient Active Problem List  Diagnosis   • Anemia   • Colon polyps   • Depression   • Fatty liver   • Hashimoto's thyroiditis   • Hyperlipidemia, mixed   • Hypertension, benign   • Impaired fasting glucose   • Lumbosacral radiculitis   • Spondylolisthesis at L4-L5 level   • Strain of rotator cuff capsule   • Vitamin D deficiency   • Labral tear of " right hip joint   • Class 2 severe obesity with serious comorbidity and body mass index (BMI) of 36.0 to 36.9 in adult   [2]  Past Medical History:  Diagnosis Date   • Acute thromboembolism of deep veins of right lower extremity 04/09/2023   • COVID-19 06/06/2022    COVID-19   • Diverticulitis of intestine, part unspecified, without perforation or abscess without bleeding 04/26/2018    Acute diverticulitis   • 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    Hyperhomocystinemia   • Other abnormal and inconclusive findings on diagnostic imaging of breast 10/02/2014    Abnormal mammogram   • Other noninflammatory disorders of ovary, fallopian tube and broad ligament 11/02/2015    Ovarian mass   • Personal history of malignant neoplasm, unspecified     Personal history of malignant neoplasm   • Personal history of other (healed) physical injury and trauma     History of spinal cord injury   • Personal history of other benign neoplasm 06/11/2020    History of other benign neoplasm   • Personal history of other diseases of the circulatory system     History of hypertension   • Personal history of other diseases of the musculoskeletal system and connective tissue     History of back pain   • Personal history of other diseases of the nervous system and sense organs     History of migraine   • Personal history of other diseases of the respiratory system 12/17/2018    History of upper respiratory infection   • Personal history of other endocrine, nutritional and metabolic disease     History of obesity   • Personal history of other infectious and parasitic diseases     History of varicella   • Personal history of other specified conditions     History of abdominal pain   • Personal history of other specified conditions     History of headache   • Personal history of pneumonia (recurrent) 07/06/2015    History of pneumonia   [3]  Past Surgical History:  Procedure Laterality Date   • BACK SURGERY  10/10/2014    Back Surgery   •  COLONOSCOPY  10/10/2014    Complete Colonoscopy   • OTHER SURGICAL HISTORY  2020    Breast augmentation   • OTHER SURGICAL HISTORY  10/16/2019    Breast biopsy   • OTHER SURGICAL HISTORY  10/16/2019    Tubal ligation   • TOTAL ABDOMINAL HYSTERECTOMY W/ BILATERAL SALPINGOOPHORECTOMY  2016    Total Abdominal Hysterectomy With Removal Of Both Ovaries   [4]  Social History  Tobacco Use   Smoking Status Former   • Current packs/day: 0.00   • Average packs/day: 0.5 packs/day for 17.0 years (8.5 ttl pk-yrs)   • Types: Cigarettes   • Start date:    • Quit date:    • Years since quittin.3   Smokeless Tobacco Not on file   [5]  Family History  Problem Relation Name Age of Onset   • Hypertension Mother     • Clotting disorder Mother     • Other (cardiac disorder) Mother     • No Known Problems Father     • Hypertension Sister     • Other (multiple allergies) Sister     • Cancer Maternal Grandmother     [6]  No Known Allergies       [1]  Patient Active Problem List  Diagnosis   • Anemia   • Colon polyps   • Depression   • Fatty liver   • Hashimoto's thyroiditis   • Hyperlipidemia, mixed   • Hypertension, benign   • Impaired fasting glucose   • Lumbosacral radiculitis   • Spondylolisthesis at L4-L5 level   • Strain of rotator cuff capsule   • Vitamin D deficiency   • Labral tear of right hip joint   • Class 2 severe obesity with serious comorbidity and body mass index (BMI) of 36.0 to 36.9 in adult   [2]  Past Medical History:  Diagnosis Date   • Acute thromboembolism of deep veins of right lower extremity 2023   • COVID-19 2022    COVID-19   • Diverticulitis of intestine, part unspecified, without perforation or abscess without bleeding 2018    Acute diverticulitis   • Homocystinuria (Multi) 2017    Hyperhomocystinemia   • Other abnormal and inconclusive findings on diagnostic imaging of breast 10/02/2014    Abnormal mammogram   • Other noninflammatory disorders of ovary,  fallopian tube and broad ligament 2015    Ovarian mass   • Personal history of malignant neoplasm, unspecified     Personal history of malignant neoplasm   • Personal history of other (healed) physical injury and trauma     History of spinal cord injury   • Personal history of other benign neoplasm 2020    History of other benign neoplasm   • Personal history of other diseases of the circulatory system     History of hypertension   • Personal history of other diseases of the musculoskeletal system and connective tissue     History of back pain   • Personal history of other diseases of the nervous system and sense organs     History of migraine   • Personal history of other diseases of the respiratory system 2018    History of upper respiratory infection   • Personal history of other endocrine, nutritional and metabolic disease     History of obesity   • Personal history of other infectious and parasitic diseases     History of varicella   • Personal history of other specified conditions     History of abdominal pain   • Personal history of other specified conditions     History of headache   • Personal history of pneumonia (recurrent) 2015    History of pneumonia   [3]  Past Surgical History:  Procedure Laterality Date   • BACK SURGERY  10/10/2014    Back Surgery   • COLONOSCOPY  10/10/2014    Complete Colonoscopy   • OTHER SURGICAL HISTORY  2020    Breast augmentation   • OTHER SURGICAL HISTORY  10/16/2019    Breast biopsy   • OTHER SURGICAL HISTORY  10/16/2019    Tubal ligation   • TOTAL ABDOMINAL HYSTERECTOMY W/ BILATERAL SALPINGOOPHORECTOMY  2016    Total Abdominal Hysterectomy With Removal Of Both Ovaries   [4]  Social History  Tobacco Use   Smoking Status Former   • Current packs/day: 0.00   • Average packs/day: 0.5 packs/day for 17.0 years (8.5 ttl pk-yrs)   • Types: Cigarettes   • Start date:    • Quit date:    • Years since quittin.3   Smokeless Tobacco Not  on file   [5]  Family History  Problem Relation Name Age of Onset   • Hypertension Mother     • Clotting disorder Mother     • Other (cardiac disorder) Mother     • No Known Problems Father     • Hypertension Sister     • Other (multiple allergies) Sister     • Cancer Maternal Grandmother     [6]  No Known Allergies

## 2025-05-16 NOTE — PROGRESS NOTES
Endocrinology New Patient Consult  Subjective   Patient ID: Kristyn Quinones is a 64 y.o. female who presents for Hypothyroidism, Hashimoto's Thyroiditis, and Weight Gain. Patient was referred by TALITA Nicole    PCP: TALITA Nicole    HPI  64-year-old here for evaluation of hypothyroidism and obesity.  Patient states she has been on thyroid medication for about 20 years.  She has been on [place] most of that time due to lack of feeling well on levothyroxine alone.  She has had issues with fluctuating levels.  Most recently she was on 60 mg and increased to?  Alternating 60 and 75 mg with a TSH of 5.8 at the end of April.  She complains of fatigue and brain fog.  She is also struggled with her weight since menopause and has been unable to lose.  She has been trying to watch her eating habits on and off and exercise but exercise is limited due to arthritic complaints.  She does take her thyroid medicine in the morning with her other medications including vitamins.    I have personally reviewed the OARRS report for EL   . This is recorded in the EMR.  I have considered the risks of abuse, dependence, addiction, and diversion.  I believe that it is clinically appropriate for EL   to be prescribed this medication.        Review of Systems   Constitutional:  Negative for chills, fatigue and fever.   Respiratory:  Negative for cough and shortness of breath.    Cardiovascular:  Negative for chest pain and palpitations.   Gastrointestinal:  Negative for diarrhea, nausea and vomiting.   Neurological:  Negative for headaches.       Problem List[1]    Medical History[2]     Surgical History[3]     Tobacco Use History[4]   Social History     Substance and Sexual Activity   Alcohol Use Not Currently      Marital status:   Employment: Retired    Family History[5]     Home Meds:  Current Outpatient Medications   Medication Instructions    buPROPion XL (WELLBUTRIN XL) 300 mg, oral, Daily     "fluticasone (Flonase) 50 mcg/actuation nasal spray 1 spray, Each Nostril, Daily, Shake gently. Before first use, prime pump. After use, clean tip and replace cap.    folic acid (FOLVITE) 1 mg, oral, Daily    gabapentin (NEURONTIN) 600 mg, oral, 2 times daily    lisinopril 20 mg, oral, Daily    simvastatin (ZOCOR) 40 mg, oral, Daily    thyroid (pork) (ARMOUR THYROID) 15 mg, oral, 2 times weekly, Take 15 mg [place] every 3 days with 60 mg dose = total dose 75 mg twice weekly.    thyroid (pork) (ARMOUR) 60 mg, oral, Daily before breakfast        RX Allergies[6]     Objective   Vitals:    05/16/25 1246   BP: 122/70   Pulse: 80   Resp: 16      Vitals:    05/16/25 1246   Weight: 95 kg (209 lb 6.4 oz)      Body mass index is 35.92 kg/m².   Physical Exam  Constitutional:       Appearance: Normal appearance. She is overweight.   HENT:      Head: Normocephalic and atraumatic.   Neck:      Thyroid: No thyroid mass, thyromegaly or thyroid tenderness.   Cardiovascular:      Rate and Rhythm: Normal rate and regular rhythm.      Heart sounds: No murmur heard.     No gallop.   Pulmonary:      Effort: Pulmonary effort is normal.      Breath sounds: Normal breath sounds.   Abdominal:      Palpations: Abdomen is soft.      Comments: benign   Neurological:      General: No focal deficit present.      Mental Status: She is alert and oriented to person, place, and time.      Deep Tendon Reflexes: Reflexes are normal and symmetric.   Psychiatric:         Behavior: Behavior is cooperative.         Labs:  Lab Results   Component Value Date    HGBA1C 6.1 (H) 02/28/2025    TSH 5.83 (H) 04/29/2025    FREET4 0.8 04/29/2025      No results found for: \"PR1\", \"THYROIDPAB\", \"TSI\"     Assessment/Plan   Assessment & Plan  Hashimoto's thyroiditis    Orders:    Referral to Endocrinology    thyroid, pork, ([place] Thyroid) 15 mg tablet tablet; Take 1 tablet (15 mg) by mouth once daily in the morning. Take before meals. Twith 60 mg dose = total dose 75 mg " daily    TSH with reflex to Free T4 if abnormal; Future    Body mass index (BMI) 35.0-35.9, adult    Orders:    Referral to Endocrinology    phentermine-topiramate (Qsymia) 3.75-23 mg capsule, ER multiphase 24 hr; Take 1 capsule by mouth once daily for 14 days.    phentermine-topiramate (Qsymia) 7.5-46 mg capsule, ER multiphase 24 hr; Take 1 capsule by mouth once daily.  64-year-old here for evaluation of hypothyroidism and obesity.  We discussed her course in detail.  We discussed the absolute importance of her taking her thyroid medicine in the morning on an empty stomach separately from her other medications.  We also discussed fluctuations with natural formulations such as [place].  We will start with 75 mg every day and recheck blood work in 2 months.  In the meantime we discussed her weight and went over options including weekly injectables versus Qsymia.  She is reluctant to go forward with weekly injectables due to cost but would like to try Qsymia.  We did discuss potential side effects.  She will obtain it through [facility].  I will see her back in 3 months.  I encouraged her to work on diet and exercise    Electronically signed by:  Manuela Neal MD 05/16/25  12:46 PM         [1]   Patient Active Problem List  Diagnosis    Anemia    Colon polyps    Depression    Fatty liver    Hashimoto's thyroiditis    Hyperlipidemia, mixed    Hypertension, benign    Impaired fasting glucose    Lumbosacral radiculitis    Spondylolisthesis at L4-L5 level    Strain of rotator cuff capsule    Vitamin D deficiency    Labral tear of right hip joint    Class 2 severe obesity with serious comorbidity and body mass index (BMI) of 36.0 to 36.9 in adult   [2]   Past Medical History:  Diagnosis Date    Acute thromboembolism of deep veins of right lower extremity 04/09/2023    COVID-19 06/06/2022    COVID-19    Diverticulitis of intestine, part unspecified, without perforation or abscess without bleeding 04/26/2018    Acute  diverticulitis    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    Hyperhomocystinemia    Other abnormal and inconclusive findings on diagnostic imaging of breast 10/02/2014    Abnormal mammogram    Other noninflammatory disorders of ovary, fallopian tube and broad ligament 11/02/2015    Ovarian mass    Personal history of malignant neoplasm, unspecified     Personal history of malignant neoplasm    Personal history of other (healed) physical injury and trauma     History of spinal cord injury    Personal history of other benign neoplasm 06/11/2020    History of other benign neoplasm    Personal history of other diseases of the circulatory system     History of hypertension    Personal history of other diseases of the musculoskeletal system and connective tissue     History of back pain    Personal history of other diseases of the nervous system and sense organs     History of migraine    Personal history of other diseases of the respiratory system 12/17/2018    History of upper respiratory infection    Personal history of other endocrine, nutritional and metabolic disease     History of obesity    Personal history of other infectious and parasitic diseases     History of varicella    Personal history of other specified conditions     History of abdominal pain    Personal history of other specified conditions     History of headache    Personal history of pneumonia (recurrent) 07/06/2015    History of pneumonia   [3]   Past Surgical History:  Procedure Laterality Date    BACK SURGERY  10/10/2014    Back Surgery    COLONOSCOPY  10/10/2014    Complete Colonoscopy    OTHER SURGICAL HISTORY  03/06/2020    Breast augmentation    OTHER SURGICAL HISTORY  10/16/2019    Breast biopsy    OTHER SURGICAL HISTORY  10/16/2019    Tubal ligation    TOTAL ABDOMINAL HYSTERECTOMY W/ BILATERAL SALPINGOOPHORECTOMY  05/11/2016    Total Abdominal Hysterectomy With Removal Of Both Ovaries   [4]   Social History  Tobacco Use   Smoking Status Former     Current packs/day: 0.00    Average packs/day: 0.5 packs/day for 17.0 years (8.5 ttl pk-yrs)    Types: Cigarettes    Start date:     Quit date:     Years since quittin.3   Smokeless Tobacco Not on file   [5]   Family History  Problem Relation Name Age of Onset    Hypertension Mother      Clotting disorder Mother      Other (cardiac disorder) Mother      No Known Problems Father      Hypertension Sister      Other (multiple allergies) Sister      Cancer Maternal Grandmother     [6] No Known Allergies

## 2025-05-19 DIAGNOSIS — E06.3 HASHIMOTO'S THYROIDITIS: Primary | ICD-10-CM

## 2025-05-19 RX ORDER — THYROID 60 MG/1
TABLET ORAL
Qty: 90 TABLET | Refills: 1 | Status: SHIPPED | OUTPATIENT
Start: 2025-05-19

## 2025-05-19 RX ORDER — THYROID 15 MG/1
TABLET ORAL
Qty: 90 TABLET | Refills: 1 | Status: SHIPPED | OUTPATIENT
Start: 2025-05-19

## 2025-06-12 DIAGNOSIS — E06.3 HASHIMOTO'S THYROIDITIS: ICD-10-CM

## 2025-06-12 DIAGNOSIS — R79.89 ABNORMAL TSH: ICD-10-CM

## 2026-01-23 ENCOUNTER — APPOINTMENT (OUTPATIENT)
Facility: CLINIC | Age: 66
End: 2026-01-23
Payer: MEDICARE

## 2026-04-24 ENCOUNTER — APPOINTMENT (OUTPATIENT)
Facility: CLINIC | Age: 66
End: 2026-04-24
Payer: MEDICARE